# Patient Record
Sex: FEMALE | Race: WHITE | Employment: FULL TIME | URBAN - NONMETROPOLITAN AREA
[De-identification: names, ages, dates, MRNs, and addresses within clinical notes are randomized per-mention and may not be internally consistent; named-entity substitution may affect disease eponyms.]

---

## 2017-12-28 ENCOUNTER — OFFICE VISIT (OUTPATIENT)
Dept: PRIMARY CARE CLINIC | Age: 47
End: 2017-12-28
Payer: COMMERCIAL

## 2017-12-28 VITALS
HEART RATE: 85 BPM | BODY MASS INDEX: 26.43 KG/M2 | HEIGHT: 61 IN | WEIGHT: 140 LBS | RESPIRATION RATE: 24 BRPM | SYSTOLIC BLOOD PRESSURE: 113 MMHG | DIASTOLIC BLOOD PRESSURE: 75 MMHG | OXYGEN SATURATION: 95 % | TEMPERATURE: 97.4 F

## 2017-12-28 DIAGNOSIS — J02.9 SORE THROAT: ICD-10-CM

## 2017-12-28 DIAGNOSIS — J02.0 STREP PHARYNGITIS: Primary | ICD-10-CM

## 2017-12-28 LAB
INFLUENZA A ANTIBODY: NEGATIVE
INFLUENZA B ANTIBODY: NEGATIVE
S PYO AG THROAT QL: POSITIVE

## 2017-12-28 PROCEDURE — 87804 INFLUENZA ASSAY W/OPTIC: CPT | Performed by: NURSE PRACTITIONER

## 2017-12-28 PROCEDURE — G8427 DOCREV CUR MEDS BY ELIG CLIN: HCPCS | Performed by: NURSE PRACTITIONER

## 2017-12-28 PROCEDURE — 87880 STREP A ASSAY W/OPTIC: CPT | Performed by: NURSE PRACTITIONER

## 2017-12-28 PROCEDURE — G8419 CALC BMI OUT NRM PARAM NOF/U: HCPCS | Performed by: NURSE PRACTITIONER

## 2017-12-28 PROCEDURE — 99213 OFFICE O/P EST LOW 20 MIN: CPT | Performed by: NURSE PRACTITIONER

## 2017-12-28 PROCEDURE — G8484 FLU IMMUNIZE NO ADMIN: HCPCS | Performed by: NURSE PRACTITIONER

## 2017-12-28 PROCEDURE — 4004F PT TOBACCO SCREEN RCVD TLK: CPT | Performed by: NURSE PRACTITIONER

## 2017-12-28 RX ORDER — PENICILLIN V POTASSIUM 500 MG/1
500 TABLET ORAL 3 TIMES DAILY
Qty: 30 TABLET | Refills: 0 | Status: SHIPPED | OUTPATIENT
Start: 2017-12-28 | End: 2018-01-04

## 2017-12-28 ASSESSMENT — ENCOUNTER SYMPTOMS
WHEEZING: 0
COUGH: 0
SORE THROAT: 1

## 2017-12-28 NOTE — PROGRESS NOTES
Review of Systems   Constitutional: Positive for chills, fatigue and fever. HENT: Positive for sore throat. Negative for congestion, ear discharge and ear pain. Respiratory: Negative for cough and wheezing. Cardiovascular: Negative. Skin: Negative. Hematological: Negative. Objective:     Physical Exam   Constitutional: She appears well-developed and well-nourished. She is cooperative. Appearing to be of stated age with warm and dry skin, no apparent distress; well-appearing, well-hydrated, non-toxic, alert and oriented, in no apparent distress. HENT:   Head: Normocephalic and atraumatic. Right Ear: Tympanic membrane normal.   Left Ear: Tympanic membrane normal.   Nose: Nose normal.   Mouth/Throat: Uvula is midline and mucous membranes are normal. No trismus in the jaw. No dental abscesses. Posterior oropharyngeal erythema (mild) present. No oropharyngeal exudate or posterior oropharyngeal edema. Cardiovascular: Normal rate, regular rhythm and normal heart sounds. No murmur heard. Pulmonary/Chest: Effort normal and breath sounds normal.    Normal respiratory effort. Lungs clear to auscultation over anterior and posterior lung fields bilaterally. No rales, rhonchi or wheezing. No SOB or coughing noted during exam conversation. Lymphadenopathy:     Cervical adenopathy: shotty bilateraly anterior cervical chains. Nursing note and vitals reviewed. /75   Pulse 85   Temp 97.4 °F (36.3 °C) (Oral)   Resp 24   Ht 5' 1.2\" (1.554 m)   Wt 140 lb (63.5 kg)   SpO2 95%   BMI 26.28 kg/m²     Assessment:     1. Strep pharyngitis  penicillin v potassium (VEETID) 500 MG tablet   2. Sore throat  POCT rapid strep A    POCT Influenza A/B       Plan:   Palak Leon was seen today for other, fever and fatigue. Diagnoses and all orders for this visit:    Strep pharyngitis  -     penicillin v potassium (VEETID) 500 MG tablet;  Take 1 tablet by mouth 3 times daily for 10 days    Sore throat  -     POCT rapid strep A   Positive; results discussed with patient at time of exam.  -     POCT Influenza A/B   Negative; results discussed with patient time of exam.    Will recommend PCN VK and acetaminophen, ibuprofen with supportive care including increased rest and hydration. NP reviewed  administration, expected effects and possible side effects and have encouraged a probiotic of choice. Questions answered. They verbalized understanding and agreement. Patient instructions written and verbal given. Carlotta Elias and or guardian received counseling on medication adherence    Return for ED for worsening symptoms.     Orders Placed This Encounter   Medications    penicillin v potassium (VEETID) 500 MG tablet     Sig: Take 1 tablet by mouth 3 times daily for 10 days     Dispense:  30 tablet     Refill:  0        Electronically signed by Yvon Walter NP on 12/28/2017 at 7:02 PM

## 2017-12-28 NOTE — PATIENT INSTRUCTIONS
Patient Education        Strep Throat: Care Instructions  Your Care Instructions    Strep throat is a bacterial infection that causes sudden, severe sore throat and fever. Strep throat, which is caused by bacteria called streptococcus, is treated with antibiotics. Sometimes a strep test is necessary to tell if the sore throat is caused by strep bacteria. Treatment can help ease symptoms and may prevent future problems. Follow-up care is a key part of your treatment and safety. Be sure to make and go to all appointments, and call your doctor if you are having problems. It's also a good idea to know your test results and keep a list of the medicines you take. How can you care for yourself at home? · Take your antibiotics as directed. Do not stop taking them just because you feel better. You need to take the full course of antibiotics. · Strep throat can spread to others until 24 hours after you begin taking antibiotics. During this time, you should avoid contact with other people at work or home, especially infants and children. Do not sneeze or cough on others, and wash your hands often. Keep your drinking glass and eating utensils separate from those of others, and wash these items well in hot, soapy water. · Gargle with warm salt water at least once each hour to help reduce swelling and make your throat feel better. Use 1 teaspoon of salt mixed in 8 fluid ounces of warm water. · Take an over-the-counter pain medication, such as acetaminophen (Tylenol), ibuprofen (Advil, Motrin), or naproxen (Aleve). Read and follow all instructions on the label. · Try an over-the-counter anesthetic throat spray or throat lozenges, which may help relieve throat pain. · Drink plenty of fluids. Fluids may help soothe an irritated throat. Hot fluids, such as tea or soup, may help your throat feel better. · Eat soft solids and drink plenty of clear liquids.  Flavored ice pops, ice cream, scrambled eggs, sherbet, and gelatin dessert (such as Jell-O) may also soothe the throat. · Get lots of rest.  · Do not smoke, and avoid secondhand smoke. If you need help quitting, talk to your doctor about stop-smoking programs and medicines. These can increase your chances of quitting for good. · Use a vaporizer or humidifier to add moisture to the air in your bedroom. Follow the directions for cleaning the machine. When should you call for help? Call your doctor now or seek immediate medical care if:  ? · You have a new or higher fever. ? · You have a fever with a stiff neck or severe headache. ? · You have new or worse trouble swallowing. ? · Your sore throat gets much worse on one side. ? · Your pain becomes much worse on one side of your throat. ? Watch closely for changes in your health, and be sure to contact your doctor if:  ? · You are not getting better after 2 days (48 hours). ? · You do not get better as expected. Where can you learn more? Go to https://[employer]. org and sign in to your [employer] account. Enter K625 in the [facility] box to learn more about \"Strep Throat: Care Instructions. \"     If you do not have an account, please click on the \"Sign Up Now\" link. Current as of: May 12, 2017  Content Version: 11.4  © 2098-2029 [employer]. Care instructions adapted under license by [facility] ([facility]). If you have questions about a medical condition or this instruction, always ask your healthcare professional. [place] [address] any warranty or liability for your use of this information. Patient Education          penicillin V potassium (oral)  Pronunciation:  PEN i MAXIMILIAN in V corbin TAS ee um  Brand:  PC Pen VK  What is the most important information I should know about penicillin V potassium? You should not take this medication if you are allergic to penicillin.  Tell your doctor if you have ever had an allergic reaction to a cephalosporin antibiotic such as Ceftin, Cefzil, Omnicef, Keflex, and others. Before taking penicillin V potassium, tell your doctor if you are allergic to cephalosporins such as Ceftin, Cefzil, Keflex, Omnicef, and others, or if you have asthma, kidney disease, a bleeding or blood clotting disorder, a history of diarrhea caused by taking antibiotics, or a history of any type of allergy. Penicillin V potassium can make birth control pills less effective, which may result in pregnancy. Before taking penicillin V potassium, tell your doctor if you use birth control pills. Take this medication for the full prescribed length of time. Your symptoms may improve before the infection is completely cleared. Penicillin V potassium will not treat a viral infection such as the common cold or flu. Do not share this medication with another person, even if they have the same symptoms you have. Antibiotic medicines can cause diarrhea, which may be a sign of a new infection. If you have diarrhea that is watery or has blood in it, call your doctor. Do not use any medicine to stop the diarrhea unless your doctor has told you to. What is penicillin V potassium? Penicillin V potassium is a slow-onset antibiotic that fights bacteria in your body. Penicillin V potassium is used to treat many different types of infections including strep and staph infections, pneumonia, rheumatic fever, and infections affecting the mouth or throat. Penicillin V potassium is also used to prevent infections of the heart valves in people with certain heart conditions who need to have dental work or surgery. Penicillin V potassium may also be used for purposes not listed in this medication guide. What should I discuss with my healthcare provider before taking penicillin V potassium? You should not take this medication if you are allergic to penicillin.  Tell your doctor if you have ever had an allergic reaction to a cephalosporin antibiotic such as Ceftin, Cefzil, Omnicef, Keflex, extra medicine to make up the missed dose. What happens if I overdose? Seek emergency medical attention if you think you have used too much of this medicine. Overdose symptoms may include some of the serious side effects listed in this medication guide. What should I avoid while taking penicillin V potassium? Antibiotic medicines can cause diarrhea, which may be a sign of a new infection. If you have diarrhea that is watery or has blood in it, call your doctor. Do not use any medicine to stop the diarrhea unless your doctor has told you to. What are the possible side effects of penicillin V potassium? Get emergency medical help if you have any of these signs of an allergic reaction: hives; difficulty breathing; swelling of your face, lips, tongue, or throat. Call your doctor at once if you have any of these serious side effects:  · diarrhea that is watery or bloody;  · urinating less than usual or not at all;  · fever, swollen glands, sore throat, rash or itching, joint pain, general ill feeling;  · skin rash, bruising, severe tingling, numbness, pain, muscle weakness;  · pale or yellowed skin, dark colored urine, fever, confusion or weakness; or  · easy bruising, unusual bleeding (nose, mouth, vagina, or rectum), purple or red pinpoint spots under your skin. Less serious side effects are more likely to occur, such as:  · nausea, vomiting, stomach pain;  · swollen, black, or \"hairy\" tongue; or  · vaginal itching or discharge. This is not a complete list of side effects and others may occur. Call your doctor for medical advice about side effects. You may report side effects to FDA at 7-453-FDA-4118. What other drugs will affect penicillin V potassium?   Tell your doctor about all other medicines you use, especially:  · birth control pills;  · methotrexate (Rheumatrex, Trexall);  · probenecid (Benemid); or  · a tetracycline antibiotic, such as doxycycline (Doryx, Oracea, Periostat, Vibramycin), minocycline (Dynacin, Minocin, Solodyn, [place]), or tetracycline (Brodspec, Panmycin, [place], [place]). This list is not complete and other drugs may interact with penicillin V potassium. Tell your doctor about all medications you use. This includes prescription, over-the-counter, vitamin, and herbal products. Do not start a new medication without telling your doctor. Where can I get more information? Your pharmacist can provide more information about penicillin V potassium. Remember, keep this and all other medicines out of the reach of children, never share your medicines with others, and use this medication only for the indication prescribed. Every effort has been made to ensure that the information provided by [address] is accurate, up-to-date, and complete, but no guarantee is made to that effect. Drug information contained herein may be time sensitive. [facility] information has been compiled for use by healthcare practitioners and consumers in the [place] and therefore [facility] does not warrant that uses outside of the [place] are appropriate, unless specifically indicated otherwise. [facility]'s drug information does not endorse drugs, diagnose patients or recommend therapy. [facility] drug information is an informational resource designed to assist licensed healthcare practitioners in caring for their patients and/or to serve consumers viewing this service as a supplement to, and not a substitute for, the expertise, skill, knowledge and judgment of healthcare practitioners. The absence of a warning for a given drug or drug combination in no way should be construed to indicate that the drug or drug combination is safe, effective or appropriate for any given patient. [facility] does not assume any responsibility for any aspect of healthcare administered with the aid of information [facility] provides.  The information contained herein is not intended to cover all possible uses, directions, precautions, warnings, drug interactions, allergic reactions, or adverse effects. If you have questions about the drugs you are taking, check with your doctor, nurse or pharmacist.  Copyright 7884-8675 [address]. Version: 1.01. Revision date: 2/7/2011. Care instructions adapted under license by [facility] ([facility]). If you have questions about a medical condition or this instruction, always ask your healthcare professional. [place] [address] any warranty or liability for your use of this information.

## 2018-01-02 ENCOUNTER — TELEPHONE (OUTPATIENT)
Dept: PRIMARY CARE CLINIC | Age: 48
End: 2018-01-02

## 2018-01-02 NOTE — TELEPHONE ENCOUNTER
Advised pt if Tylenol is not helping her headache , provider advised for her to go to the ER. Pt voiced understanding. Health Maintenance   Topic Date Due    HIV screen  06/04/1985    DTaP/Tdap/Td vaccine (1 - Tdap) 06/04/1989    Pneumococcal med risk (1 of 1 - PPSV23) 06/04/1989    Cervical cancer screen  06/04/1991    Lipid screen  06/04/2010    Flu vaccine (1) 09/01/2017             (applicable per patient's age: Cancer Screenings, Depression Screening, Fall Risk Screening, Immunizations)    AST (U/L)   Date Value   04/29/2013 59 (H)     ALT (U/L)   Date Value   04/29/2013 118 (H)     BUN (mg/dL)   Date Value   01/25/2016 26 (H)      (goal A1C is < 7)   (goal LDL is <100) need 30-50% reduction from baseline     BP Readings from Last 3 Encounters:   12/28/17 113/75   08/04/16 108/62   01/25/16 119/72    (goal /80)      All Future Testing planned in [facility]:      Next Visit Date:  No future appointments.          Patient Active Problem List:     Smoker     Gastroesophageal reflux disease without esophagitis     Bronchitis

## 2018-01-04 ENCOUNTER — APPOINTMENT (OUTPATIENT)
Dept: CT IMAGING | Age: 48
End: 2018-01-04
Payer: COMMERCIAL

## 2018-01-04 ENCOUNTER — HOSPITAL ENCOUNTER (EMERGENCY)
Age: 48
Discharge: HOME | End: 2018-01-04
Attending: FAMILY MEDICINE
Payer: COMMERCIAL

## 2018-01-04 VITALS
OXYGEN SATURATION: 98 % | DIASTOLIC BLOOD PRESSURE: 71 MMHG | SYSTOLIC BLOOD PRESSURE: 122 MMHG | TEMPERATURE: 98.3 F | RESPIRATION RATE: 16 BRPM | HEART RATE: 77 BPM

## 2018-01-04 DIAGNOSIS — R59.0 ANTERIOR CERVICAL ADENOPATHY: ICD-10-CM

## 2018-01-04 DIAGNOSIS — J03.90 ACUTE TONSILLITIS, UNSPECIFIED ETIOLOGY: Primary | ICD-10-CM

## 2018-01-04 LAB
ABSOLUTE EOS #: 0.2 K/UL (ref 0–0.4)
ABSOLUTE IMMATURE GRANULOCYTE: NORMAL K/UL (ref 0–0.3)
ABSOLUTE LYMPH #: 2.6 K/UL (ref 1–4.8)
ABSOLUTE MONO #: 0.7 K/UL (ref 0–1)
ANION GAP SERPL CALCULATED.3IONS-SCNC: 16 MMOL/L (ref 9–17)
BASOPHILS # BLD: 0 % (ref 0–2)
BASOPHILS ABSOLUTE: 0 K/UL (ref 0–0.2)
BUN BLDV-MCNC: 16 MG/DL (ref 6–20)
BUN/CREAT BLD: 31 (ref 9–20)
CALCIUM SERPL-MCNC: 9.6 MG/DL (ref 8.6–10.4)
CHLORIDE BLD-SCNC: 101 MMOL/L (ref 98–107)
CO2: 21 MMOL/L (ref 20–31)
CREAT SERPL-MCNC: 0.52 MG/DL (ref 0.5–0.9)
DIFFERENTIAL TYPE: YES
EOSINOPHILS RELATIVE PERCENT: 2 % (ref 0–5)
GFR AFRICAN AMERICAN: >60 ML/MIN
GFR NON-AFRICAN AMERICAN: >60 ML/MIN
GFR SERPL CREATININE-BSD FRML MDRD: ABNORMAL ML/MIN/{1.73_M2}
GFR SERPL CREATININE-BSD FRML MDRD: ABNORMAL ML/MIN/{1.73_M2}
GLUCOSE BLD-MCNC: 100 MG/DL (ref 70–99)
HCT VFR BLD CALC: 41.5 % (ref 36–46)
HEMOGLOBIN: 14 G/DL (ref 12–16)
IMMATURE GRANULOCYTES: NORMAL %
LYMPHOCYTES # BLD: 27 % (ref 15–40)
MCH RBC QN AUTO: 30 PG (ref 26–34)
MCHC RBC AUTO-ENTMCNC: 33.7 G/DL (ref 31–37)
MCV RBC AUTO: 89 FL (ref 80–100)
MONOCYTES # BLD: 7 % (ref 4–8)
MONONUCLEOSIS SCREEN: NEGATIVE
PDW BLD-RTO: 14.5 % (ref 12.1–15.2)
PLATELET # BLD: 375 K/UL (ref 140–450)
PLATELET ESTIMATE: NORMAL
PMV BLD AUTO: NORMAL FL (ref 6–12)
POTASSIUM SERPL-SCNC: 4.1 MMOL/L (ref 3.7–5.3)
RBC # BLD: 4.66 M/UL (ref 4–5.2)
RBC # BLD: NORMAL 10*6/UL
SEG NEUTROPHILS: 64 % (ref 47–75)
SEGMENTED NEUTROPHILS ABSOLUTE COUNT: 6.1 K/UL (ref 2.5–7)
SODIUM BLD-SCNC: 138 MMOL/L (ref 135–144)
WBC # BLD: 9.7 K/UL (ref 3.5–11)
WBC # BLD: NORMAL 10*3/UL

## 2018-01-04 PROCEDURE — 99284 EMERGENCY DEPT VISIT MOD MDM: CPT

## 2018-01-04 PROCEDURE — 6360000002 HC RX W HCPCS: Performed by: EMERGENCY MEDICINE

## 2018-01-04 PROCEDURE — 2580000003 HC RX 258: Performed by: EMERGENCY MEDICINE

## 2018-01-04 PROCEDURE — 80048 BASIC METABOLIC PNL TOTAL CA: CPT

## 2018-01-04 PROCEDURE — 6360000004 HC RX CONTRAST MEDICATION: Performed by: FAMILY MEDICINE

## 2018-01-04 PROCEDURE — 85025 COMPLETE CBC W/AUTO DIFF WBC: CPT

## 2018-01-04 PROCEDURE — 36415 COLL VENOUS BLD VENIPUNCTURE: CPT

## 2018-01-04 PROCEDURE — 70491 CT SOFT TISSUE NECK W/DYE: CPT

## 2018-01-04 PROCEDURE — 86308 HETEROPHILE ANTIBODY SCREEN: CPT

## 2018-01-04 RX ORDER — CEFDINIR 300 MG/1
300 CAPSULE ORAL 2 TIMES DAILY
Qty: 14 CAPSULE | Refills: 0 | Status: SHIPPED | OUTPATIENT
Start: 2018-01-04 | End: 2018-01-11

## 2018-01-04 RX ADMIN — IOVERSOL 75 ML: 741 INJECTION INTRA-ARTERIAL; INTRAVENOUS at 08:05

## 2018-01-04 ASSESSMENT — PAIN SCALES - GENERAL
PAINLEVEL_OUTOF10: 8
PAINLEVEL_OUTOF10: 8

## 2018-01-04 NOTE — ED PROVIDER NOTES
RADIOLOGY: non-plain film images(s) such as CT, Ultrasound and MRI are read by the radiologist.  CT Soft Tissue Neck W Contrast   Final Result      Symmetric prominence of the adenoid and palatine tonsils which appear    hyperenhancing/hyperemic. No evident mass, fluid collection or abscess. Findings may be consistent with tonsillitis/pharyngitis. LABS:   Labs Reviewed   BASIC METABOLIC PANEL - Abnormal; Notable for the following:        Result Value    Glucose 100 (*)     Bun/Cre Ratio 31 (*)     All other components within normal limits   CBC WITH AUTO DIFFERENTIAL   MONONUCLEOSIS SCREEN       EMERGENCY DEPARTMENT COURSE:   Vitals:    Vitals:    01/04/18 0636 01/04/18 0815   BP: 128/71 122/71   Pulse: 88 77   Resp: 20 16   Temp: 97.6 °F (36.4 °C) 98.3 °F (36.8 °C)   TempSrc: Oral    SpO2: 97% 98%     Patient history and physical exam taken at bedside, discussed patient's symptoms and exam findings as well as plan workup to include blood work, IV access, CT soft tissue neck. Patient sitting upright in bed position of comfort, acknowledges workup. Labs reviewed    Patient care transferred to Dr. Kayode Paris, ED physician, at 0800 hours        FINAL IMPRESSION      1. Acute tonsillitis, unspecified etiology    2.  Anterior cervical adenopathy          DISPOSITION/PLAN       PATIENT REFERRED TO:  Sheryl Sotelo NP  [address]  [phone]    In 2 days  As needed, If symptoms worsen      DISCHARGE MEDICATIONS:  Discharge Medication List as of 1/4/2018  9:16 AM      START taking these medications    Details   cefdinir (OMNICEF) 300 MG capsule Take 1 capsule by mouth 2 times daily for 7 days, Disp-14 capsule, R-0Print                 Summation      Patient Course:     ED Medications administered this visit:    Medications   ioversol (OPTIRAY) 74 % injection 75 mL (75 mLs Intravenous Given 1/4/18 0805)       New Prescriptions from this visit:    Discharge Medication List as of 1/4/2018  9:16 AM      START taking these medications    Details   cefdinir (OMNICEF) 300 MG capsule Take 1 capsule by mouth 2 times daily for 7 days, Disp-14 capsule, R-0Print             Follow-up:  Sy Yusuf NP  [address] [facility] [place] [address]  [phone]    In 2 days  As needed, If symptoms worsen        Final Impression:   1. Acute tonsillitis, unspecified etiology    2.  Anterior cervical adenopathy               (Please note that portions of this note were completed with a voice recognition program.  Efforts were made to edit the dictations but occasionally words are mis-transcribed.)    MD Georgette Hagen MD  01/05/18 4590

## 2018-01-24 ENCOUNTER — HOSPITAL ENCOUNTER (EMERGENCY)
Age: 48
Discharge: HOME | End: 2018-01-24
Attending: FAMILY MEDICINE
Payer: COMMERCIAL

## 2018-01-24 VITALS
BODY MASS INDEX: 25.76 KG/M2 | SYSTOLIC BLOOD PRESSURE: 117 MMHG | WEIGHT: 140 LBS | HEART RATE: 79 BPM | TEMPERATURE: 98.2 F | RESPIRATION RATE: 16 BRPM | OXYGEN SATURATION: 98 % | HEIGHT: 62 IN | DIASTOLIC BLOOD PRESSURE: 77 MMHG

## 2018-01-24 DIAGNOSIS — K21.9 GASTROESOPHAGEAL REFLUX DISEASE, ESOPHAGITIS PRESENCE NOT SPECIFIED: Primary | ICD-10-CM

## 2018-01-24 LAB
ABSOLUTE EOS #: 0.2 K/UL (ref 0–0.4)
ABSOLUTE IMMATURE GRANULOCYTE: NORMAL K/UL (ref 0–0.3)
ABSOLUTE LYMPH #: 2.8 K/UL (ref 1–4.8)
ABSOLUTE MONO #: 0.4 K/UL (ref 0–1)
ANION GAP SERPL CALCULATED.3IONS-SCNC: 12 MMOL/L (ref 9–17)
BASOPHILS # BLD: 1 % (ref 0–2)
BASOPHILS ABSOLUTE: 0 K/UL (ref 0–0.2)
BUN BLDV-MCNC: 11 MG/DL (ref 6–20)
BUN/CREAT BLD: 17 (ref 9–20)
CALCIUM SERPL-MCNC: 9.4 MG/DL (ref 8.6–10.4)
CHLORIDE BLD-SCNC: 102 MMOL/L (ref 98–107)
CO2: 25 MMOL/L (ref 20–31)
CREAT SERPL-MCNC: 0.66 MG/DL (ref 0.5–0.9)
DIFFERENTIAL TYPE: YES
DIRECT EXAM: NORMAL
EOSINOPHILS RELATIVE PERCENT: 2 % (ref 0–5)
GFR AFRICAN AMERICAN: >60 ML/MIN
GFR NON-AFRICAN AMERICAN: >60 ML/MIN
GFR SERPL CREATININE-BSD FRML MDRD: NORMAL ML/MIN/{1.73_M2}
GFR SERPL CREATININE-BSD FRML MDRD: NORMAL ML/MIN/{1.73_M2}
GLUCOSE BLD-MCNC: 96 MG/DL (ref 70–99)
HCT VFR BLD CALC: 39.9 % (ref 36–46)
HEMOGLOBIN: 13.3 G/DL (ref 12–16)
IMMATURE GRANULOCYTES: NORMAL %
LYMPHOCYTES # BLD: 36 % (ref 15–40)
Lab: NORMAL
Lab: NORMAL
MCH RBC QN AUTO: 30 PG (ref 26–34)
MCHC RBC AUTO-ENTMCNC: 33.4 G/DL (ref 31–37)
MCV RBC AUTO: 89.6 FL (ref 80–100)
MONOCYTES # BLD: 5 % (ref 4–8)
MONONUCLEOSIS SCREEN: NEGATIVE
NRBC AUTOMATED: NORMAL PER 100 WBC
PDW BLD-RTO: 14.5 % (ref 12.1–15.2)
PLATELET # BLD: 300 K/UL (ref 140–450)
PLATELET ESTIMATE: NORMAL
PMV BLD AUTO: NORMAL FL (ref 6–12)
POTASSIUM SERPL-SCNC: 3.8 MMOL/L (ref 3.7–5.3)
RBC # BLD: 4.45 M/UL (ref 4–5.2)
RBC # BLD: NORMAL 10*6/UL
SEG NEUTROPHILS: 56 % (ref 47–75)
SEGMENTED NEUTROPHILS ABSOLUTE COUNT: 4.4 K/UL (ref 2.5–7)
SODIUM BLD-SCNC: 139 MMOL/L (ref 135–144)
SPECIMEN DESCRIPTION: NORMAL
SPECIMEN DESCRIPTION: NORMAL
STATUS: NORMAL
STATUS: NORMAL
WBC # BLD: 7.8 K/UL (ref 3.5–11)
WBC # BLD: NORMAL 10*3/UL

## 2018-01-24 PROCEDURE — 87651 STREP A DNA AMP PROBE: CPT

## 2018-01-24 PROCEDURE — 99283 EMERGENCY DEPT VISIT LOW MDM: CPT

## 2018-01-24 PROCEDURE — 80048 BASIC METABOLIC PNL TOTAL CA: CPT

## 2018-01-24 PROCEDURE — 86308 HETEROPHILE ANTIBODY SCREEN: CPT

## 2018-01-24 PROCEDURE — 85025 COMPLETE CBC W/AUTO DIFF WBC: CPT

## 2018-01-24 PROCEDURE — 36415 COLL VENOUS BLD VENIPUNCTURE: CPT

## 2018-01-24 PROCEDURE — 87804 INFLUENZA ASSAY W/OPTIC: CPT

## 2018-01-24 RX ORDER — PANTOPRAZOLE SODIUM 40 MG/1
40 TABLET, DELAYED RELEASE ORAL DAILY
Qty: 30 TABLET | Refills: 0 | Status: SHIPPED | OUTPATIENT
Start: 2018-01-24 | End: 2022-01-05

## 2018-01-24 RX ORDER — ALUMINA, MAGNESIA, AND SIMETHICONE 2400; 2400; 240 MG/30ML; MG/30ML; MG/30ML
30 SUSPENSION ORAL EVERY 6 HOURS PRN
Qty: 1 BOTTLE | Refills: 0 | Status: SHIPPED | OUTPATIENT
Start: 2018-01-24 | End: 2019-03-19 | Stop reason: ALTCHOICE

## 2018-01-24 ASSESSMENT — PAIN DESCRIPTION - LOCATION: LOCATION: FOOT;THROAT

## 2018-01-24 ASSESSMENT — PAIN DESCRIPTION - ORIENTATION: ORIENTATION: RIGHT

## 2018-01-24 ASSESSMENT — PAIN SCALES - GENERAL: PAINLEVEL_OUTOF10: 3

## 2018-01-25 LAB
DIRECT EXAM: NORMAL
DIRECT EXAM: NORMAL
Lab: NORMAL
SPECIMEN DESCRIPTION: NORMAL
SPECIMEN DESCRIPTION: NORMAL
STATUS: NORMAL

## 2019-03-19 ENCOUNTER — HOSPITAL ENCOUNTER (EMERGENCY)
Age: 49
Discharge: HOME | End: 2019-03-19
Attending: FAMILY MEDICINE
Payer: COMMERCIAL

## 2019-03-19 VITALS
DIASTOLIC BLOOD PRESSURE: 74 MMHG | HEART RATE: 82 BPM | WEIGHT: 148 LBS | BODY MASS INDEX: 27.07 KG/M2 | RESPIRATION RATE: 20 BRPM | OXYGEN SATURATION: 99 % | SYSTOLIC BLOOD PRESSURE: 135 MMHG | TEMPERATURE: 97.9 F

## 2019-03-19 DIAGNOSIS — J01.90 SUBACUTE SINUSITIS, UNSPECIFIED LOCATION: Primary | ICD-10-CM

## 2019-03-19 PROCEDURE — 99283 EMERGENCY DEPT VISIT LOW MDM: CPT

## 2019-03-19 PROCEDURE — 6370000000 HC RX 637 (ALT 250 FOR IP): Performed by: FAMILY MEDICINE

## 2019-03-19 RX ORDER — CHOLESTYRAMINE LIGHT 4 G/5.7G
4 POWDER, FOR SUSPENSION ORAL DAILY
COMMUNITY
Start: 2018-10-05 | End: 2022-01-05

## 2019-03-19 RX ORDER — LATANOPROST 50 UG/ML
1 SOLUTION/ DROPS OPHTHALMIC NIGHTLY
COMMUNITY
Start: 2018-08-30 | End: 2019-06-23 | Stop reason: ALTCHOICE

## 2019-03-19 RX ORDER — TETRACAINE HYDROCHLORIDE 5 MG/ML
1 SOLUTION OPHTHALMIC ONCE
Status: COMPLETED | OUTPATIENT
Start: 2019-03-19 | End: 2019-03-19

## 2019-03-19 RX ORDER — IBUPROFEN 600 MG/1
600 TABLET ORAL EVERY 6 HOURS PRN
COMMUNITY

## 2019-03-19 RX ORDER — AMOXICILLIN AND CLAVULANATE POTASSIUM 875; 125 MG/1; MG/1
1 TABLET, FILM COATED ORAL 2 TIMES DAILY
Qty: 20 TABLET | Refills: 0 | Status: SHIPPED | OUTPATIENT
Start: 2019-03-19 | End: 2019-03-29

## 2019-03-19 RX ORDER — LORATADINE 10 MG/1
10 TABLET ORAL DAILY
COMMUNITY
Start: 2018-11-19 | End: 2019-05-22 | Stop reason: ALTCHOICE

## 2019-03-19 RX ADMIN — TETRACAINE HYDROCHLORIDE 1 DROP: 5 SOLUTION OPHTHALMIC at 08:45

## 2019-05-22 ENCOUNTER — HOSPITAL ENCOUNTER (EMERGENCY)
Age: 49
Discharge: HOME | End: 2019-05-22
Attending: FAMILY MEDICINE
Payer: COMMERCIAL

## 2019-05-22 VITALS
HEART RATE: 85 BPM | SYSTOLIC BLOOD PRESSURE: 128 MMHG | RESPIRATION RATE: 20 BRPM | TEMPERATURE: 98 F | WEIGHT: 145 LBS | DIASTOLIC BLOOD PRESSURE: 78 MMHG | BODY MASS INDEX: 26.52 KG/M2 | OXYGEN SATURATION: 100 %

## 2019-05-22 DIAGNOSIS — J01.11 ACUTE RECURRENT FRONTAL SINUSITIS: Primary | ICD-10-CM

## 2019-05-22 DIAGNOSIS — Z87.09 HISTORY OF CHRONIC SINUSITIS: ICD-10-CM

## 2019-05-22 PROCEDURE — 99282 EMERGENCY DEPT VISIT SF MDM: CPT

## 2019-05-22 RX ORDER — AMOXICILLIN AND CLAVULANATE POTASSIUM 875; 125 MG/1; MG/1
1 TABLET, FILM COATED ORAL 2 TIMES DAILY
Qty: 20 TABLET | Refills: 0 | Status: SHIPPED | OUTPATIENT
Start: 2019-05-22 | End: 2019-06-01

## 2019-05-22 RX ORDER — ASPIRIN/CALCIUM/MAG/ALUMINUM 325 MG
1 TABLET ORAL DAILY
COMMUNITY
End: 2022-02-10 | Stop reason: ALTCHOICE

## 2019-05-22 RX ORDER — FLUCONAZOLE 100 MG/1
100 TABLET ORAL DAILY
Qty: 2 TABLET | Refills: 0 | Status: SHIPPED | OUTPATIENT
Start: 2019-05-22 | End: 2019-06-23 | Stop reason: ALTCHOICE

## 2019-05-22 RX ORDER — FLUTICASONE PROPIONATE 50 MCG
2 SPRAY, SUSPENSION (ML) NASAL DAILY
COMMUNITY
Start: 2019-04-01 | End: 2022-01-05

## 2019-05-22 NOTE — ED PROVIDER NOTES
[address] [facility]  eMERGENCY dEPARTMENT eNCOUnter          [address] [facility]       Chief Complaint   Patient presents with    Sinusitis     running nose, congestion, headache and sinus pressure for past 2 weeks recently finished rx of pcn        Nurses Notes reviewed and I agree except as noted in the HPI. HISTORY OF PRESENT ILLNESS    Bird Ruelas is a 50 y.o. female who presents to emergency room via private vehicle, complaining of sinus pain and pressure greatest in the right frontal sinus, as well as some discomfort along her top teeth and gums. Patient has had significant nasal drainage, denies any fever chills, does have occasional cough. Patient has seen otolaryngology regarding her chronic sinusitis, states was advised to get CT of sinuses for further evaluation but has been unable to do so due to work limitations until June. Patient states she was told by ENT to stop using Flonase after 2 weeks, patient states she complied although did give her some relief. Patient states she stopped taking Claritin as she no longer felt it was helping. Patient does continue to smoke a 1/2 pack per day    REVIEW OF SYSTEMS     Review of Systems   All other systems reviewed and are negative. PAST MEDICAL HISTORY    has a past medical history of Asthma. SURGICAL HISTORY      has a past surgical history that includes Hysterectomy; Tubal ligation; Cholecystectomy; and shoulder surgery.     CURRENT MEDICATIONS       Discharge Medication List as of 5/22/2019  8:48 AM      CONTINUE these medications which have NOT CHANGED    Details   fluticasone (FLONASE) 50 MCG/ACT nasal spray 2 sprays by Nasal route dailyHistorical Med      Aspirin Buf,DpXde-CaJta-IrGkp, (BUFFERED ASPIRIN) 325 MG TABS Take 1 tablet by mouth dailyHistorical Med      latanoprost (XALATAN) 0.005 % ophthalmic solution Apply 1 drop to eye nightlyHistorical Med      pantoprazole (PROTONIX) 40 MG tablet Take 1 tablet by mouth daily At bedtime, Disp-30 tablet, R-0Print      ibuprofen (IBU) 600 MG tablet Take 600 mg by mouth every 6 hours as neededHistorical Med      cholestyramine light 4 g packet Take 4 g by mouth dailyHistorical Med             ALLERGIES     is allergic to adhesive tape; codeine; demerol hcl [meperidine]; dexamethasone; hydrocodone-acetaminophen; oxycodone-acetaminophen; and prednisone. FAMILY HISTORY     has no family status information on file. family history is not on file. SOCIAL HISTORY      reports that she has been smoking cigarettes. She has been smoking about 0.50 packs per day. She has never used smokeless tobacco. She reports that she does not drink alcohol or use drugs. PHYSICAL EXAM     INITIAL VITALS:  weight is 145 lb (65.8 kg). Her oral temperature is 98 °F (36.7 °C). Her blood pressure is 128/78 and her pulse is 85. Her respiration is 20 and oxygen saturation is 100%. Physical Exam   Constitutional: Patient is oriented to person, place, and time. Patient appears well-developed and well-nourished. Patient is active and cooperative. HENT:   Head: Normocephalic and atraumatic. Head is without contusion. Noted pain over the right frontal sinus, less so appreciated on left frontal and bilateral maxillary sinuses. Right Ear: Hearing and external ear normal. No drainage. Mild effusion   Left Ear: Hearing and external ear normal. No drainage. Mild effusion  Nose: Nose normal. No nasal deformity. No epistaxis. Mouth/Throat: Mucous membranes are not dry. No oral lesions or exudate, noted mild cobblestoning posterior pharynx  Eyes: EOMI. Conjunctivae, sclera, and lids are normal. Right eye exhibits no discharge. Left eye exhibits no discharge. Neck: Full passive range of motion without pain and phonation normal.   Cardiovascular:  Normal rate, regular rhythm and intact distal pulses.      Pulses: Right radial pulse  2+   Pulmonary/Chest: Effort normal. No tachypnea and no bradypnea. No wheezes, rhonchi, or rales. Abdominal: Soft. Patient without distension   Musculoskeletal:   Negative acute trauma or deformity,  apparent full range of motion and normal strength all extremities appropriate to age. Neurological: Patient is alert and oriented to person, place, and time. patient displays no tremor. Patient displays no seizure activity. .  Lymphatic:  No cervical lymphadenopathy  Skin: Skin is warm and dry. Patient is not diaphoretic. Psychiatric: Patient has a normal mood and affect. Patient speech is normal and behavior is normal. Cognition and memory are normal.      DIFFERENTIAL DIAGNOSIS:   Acute on chronic sinusitis    DIAGNOSTIC RESULTS           RADIOLOGY: non-plain film images(s) such as CT, Ultrasound and MRI are read by the radiologist.  No orders to display       LABS:   Labs Reviewed - No data to display    EMERGENCY DEPARTMENT COURSE:   Vitals:    Vitals:    05/22/19 0827   BP: 128/78   Pulse: 85   Resp: 20   Temp: 98 °F (36.7 °C)   TempSrc: Oral   SpO2: 100%   Weight: 145 lb (65.8 kg)     Patient history and physical exam taken at bedside, discussed patient's symptoms and exam findings, discussed patient's chronic sinusitis, although patient is having increased pain in the right frontal will treat as acute on chronic at this time, confirmed allergies were initially patient on Augmentin. Patient is known to get yeast infections with antibiotics will give Diflucan as well. Discussed the patient possibly restarting her Flonase for a short amount of time, discussed possibly restarting her Claritin, discussed nasal saline, nasal rinses the patient does indicate she could not tolerate these due to discomfort. Patient is advised to quit smoking. Patient advised to get her scan of her sinuses when available, and follow-up with ENT as needed, acknowledges    FINAL IMPRESSION      1. Acute recurrent frontal sinusitis    2.  History of chronic sinusitis DISPOSITION/PLAN   Discharge    PATIENT REFERRED TO:  EDER Love CNP  [address] [place] [address]  [phone]    Call         DISCHARGE MEDICATIONS:  Discharge Medication List as of 5/22/2019  8:48 AM      START taking these medications    Details   amoxicillin-clavulanate (AUGMENTIN) 875-125 MG per tablet Take 1 tablet by mouth 2 times daily for 10 days, Disp-20 tablet, R-0Print      fluconazole (DIFLUCAN) 100 MG tablet Take 1 tablet by mouth daily Take one tab PO x1 now, and one tab PO x1 at end of antibiotic use (10days), Disp-2 tablet, R-0Print                 Summation      Patient Course:  Discharge    ED Medications administered this visit:  Medications - No data to display    New Prescriptions from this visit:    Discharge Medication List as of 5/22/2019  8:48 AM      START taking these medications    Details   amoxicillin-clavulanate (AUGMENTIN) 875-125 MG per tablet Take 1 tablet by mouth 2 times daily for 10 days, Disp-20 tablet, R-0Print      fluconazole (DIFLUCAN) 100 MG tablet Take 1 tablet by mouth daily Take one tab PO x1 now, and one tab PO x1 at end of antibiotic use (10days), Disp-2 tablet, R-0Print             Follow-up:  EDER Love CNP  [address] [address]  [phone]    Call           Final Impression:   1. Acute recurrent frontal sinusitis    2.  History of chronic sinusitis               (Please note that portions of this note were completed with a voice recognition program.  Efforts were made to edit the dictations but occasionally words are mis-transcribed.)    MD Vincent Bernal MD  05/22/19 9616

## 2019-06-23 ENCOUNTER — HOSPITAL ENCOUNTER (EMERGENCY)
Age: 49
Discharge: HOME | End: 2019-06-23
Attending: EMERGENCY MEDICINE
Payer: COMMERCIAL

## 2019-06-23 VITALS
WEIGHT: 142.5 LBS | TEMPERATURE: 98 F | HEART RATE: 79 BPM | BODY MASS INDEX: 26.06 KG/M2 | DIASTOLIC BLOOD PRESSURE: 81 MMHG | RESPIRATION RATE: 20 BRPM | SYSTOLIC BLOOD PRESSURE: 127 MMHG | OXYGEN SATURATION: 97 %

## 2019-06-23 DIAGNOSIS — J02.9 ACUTE PHARYNGITIS, UNSPECIFIED ETIOLOGY: ICD-10-CM

## 2019-06-23 DIAGNOSIS — R59.0 CERVICAL LYMPHADENOPATHY: Primary | ICD-10-CM

## 2019-06-23 LAB
DIRECT EXAM: NORMAL
Lab: NORMAL
SPECIMEN DESCRIPTION: NORMAL

## 2019-06-23 PROCEDURE — 99283 EMERGENCY DEPT VISIT LOW MDM: CPT

## 2019-06-23 PROCEDURE — 87880 STREP A ASSAY W/OPTIC: CPT

## 2019-06-23 RX ORDER — AMOXICILLIN AND CLAVULANATE POTASSIUM 500; 125 MG/1; MG/1
1 TABLET, FILM COATED ORAL 3 TIMES DAILY
Qty: 30 TABLET | Refills: 0 | Status: SHIPPED | OUTPATIENT
Start: 2019-06-23 | End: 2019-07-03

## 2019-06-23 RX ORDER — NAPROXEN 375 MG/1
375 TABLET ORAL 2 TIMES DAILY WITH MEALS
Qty: 20 TABLET | Refills: 0 | Status: SHIPPED | OUTPATIENT
Start: 2019-06-23 | End: 2022-01-05

## 2019-06-23 RX ORDER — FLUCONAZOLE 100 MG/1
100 TABLET ORAL DAILY
Qty: 5 TABLET | Refills: 0 | Status: SHIPPED | OUTPATIENT
Start: 2019-06-23 | End: 2019-06-28

## 2019-06-23 ASSESSMENT — PAIN DESCRIPTION - ORIENTATION: ORIENTATION: LEFT

## 2019-06-23 ASSESSMENT — PAIN DESCRIPTION - LOCATION: LOCATION: NECK

## 2019-06-23 ASSESSMENT — PAIN DESCRIPTION - PAIN TYPE: TYPE: ACUTE PAIN

## 2019-06-23 ASSESSMENT — PAIN SCALES - GENERAL: PAINLEVEL_OUTOF10: 8

## 2019-06-23 NOTE — ED PROVIDER NOTES
Prednisone Swelling       FAMILY HISTORY    History reviewed. No pertinent family history. SOCIAL HISTORY    Social History     Socioeconomic History    Marital status:      Spouse name: None    Number of children: None    Years of education: None    Highest education level: None   Occupational History    None   Social Needs    Financial resource strain: None    Food insecurity:     Worry: None     Inability: None    Transportation needs:     Medical: None     Non-medical: None   Tobacco Use    Smoking status: Current Every Day Smoker     Packs/day: 0.50     Types: Cigarettes     Last attempt to quit: 3/29/2013     Years since quittin.2    Smokeless tobacco: Never Used   Substance and Sexual Activity    Alcohol use: No    Drug use: No    Sexual activity: None   Lifestyle    Physical activity:     Days per week: None     Minutes per session: None    Stress: None   Relationships    Social connections:     Talks on phone: None     Gets together: None     Attends Mormonism service: None     Active member of club or organization: None     Attends meetings of clubs or organizations: None     Relationship status: None    Intimate partner violence:     Fear of current or ex partner: None     Emotionally abused: None     Physically abused: None     Forced sexual activity: None   Other Topics Concern    None   Social History Narrative    None       PHYSICAL EXAM    VITAL SIGNS: /81   Pulse 79   Temp 98 °F (36.7 °C)   Resp 20   Wt 142 lb 8 oz (64.6 kg)   SpO2 97%   BMI 26.06 kg/m²   Constitutional:  Well developed, well nourished, no acute distress, non-toxic appearance   Eyes: PERRL, conjunctiva normal   HENT: Pharyngeal erythema without exudate.   Left cervical lymphadenopathy  Respiratory: Lungs are clear to auscultation bilaterally  Cardiovascular:  Normal rate, normal rhythm, no murmurs, no gallops, no rubs   Musculoskeletal:  No edema   Integument:  Well hydrated, no rash RADIOLOGY/PROCEDURES    No orders to display         Summation      Patient Course: Patient will be sent home. Patient is prescribed Augmentin. The warning signs were discussed. ED Medications administered this visit:  Medications - No data to display    New Prescriptions from this visit:    New Prescriptions    AMOXICILLIN-CLAVULANATE (AUGMENTIN) 500-125 MG PER TABLET    Take 1 tablet by mouth 3 times daily for 10 days    NAPROXEN (NAPROSYN) 375 MG TABLET    Take 1 tablet by mouth 2 times daily (with meals)       Follow-up:  No follow-up provider specified. Final Impression:   1. Cervical lymphadenopathy    2.  Acute pharyngitis, unspecified etiology               (Please note that portions of this note were completed with a voice recognition program.  Efforts were made to edit the dictations but occasionally words are mis-transcribed.)          Sarahy Treviño MD  06/23/19 5430

## 2019-11-09 ENCOUNTER — HOSPITAL ENCOUNTER (EMERGENCY)
Age: 49
Discharge: HOME | End: 2019-11-09
Attending: EMERGENCY MEDICINE
Payer: COMMERCIAL

## 2019-11-09 VITALS
BODY MASS INDEX: 27.79 KG/M2 | RESPIRATION RATE: 18 BRPM | DIASTOLIC BLOOD PRESSURE: 69 MMHG | SYSTOLIC BLOOD PRESSURE: 126 MMHG | HEART RATE: 83 BPM | WEIGHT: 151 LBS | HEIGHT: 62 IN | TEMPERATURE: 98.3 F | OXYGEN SATURATION: 95 %

## 2019-11-09 DIAGNOSIS — R09.81 SINUS CONGESTION: Primary | ICD-10-CM

## 2019-11-09 DIAGNOSIS — N30.00 ACUTE CYSTITIS WITHOUT HEMATURIA: ICD-10-CM

## 2019-11-09 LAB
-: ABNORMAL
AMORPHOUS: ABNORMAL
BACTERIA: ABNORMAL
BILIRUBIN URINE: NEGATIVE
CASTS UA: ABNORMAL /LPF
COLOR: YELLOW
COMMENT UA: ABNORMAL
CRYSTALS, UA: ABNORMAL /HPF
EPITHELIAL CELLS UA: ABNORMAL /HPF
GLUCOSE URINE: NEGATIVE
KETONES, URINE: NEGATIVE
LEUKOCYTE ESTERASE, URINE: ABNORMAL
MUCUS: ABNORMAL
NITRITE, URINE: NEGATIVE
OTHER OBSERVATIONS UA: ABNORMAL
PH UA: 7 (ref 5–8)
PROTEIN UA: NEGATIVE
RBC UA: ABNORMAL /HPF (ref 0–2)
RENAL EPITHELIAL, UA: ABNORMAL /HPF
SPECIFIC GRAVITY UA: 1.01 (ref 1–1.03)
TRICHOMONAS: ABNORMAL
TURBIDITY: CLEAR
URINE HGB: NEGATIVE
UROBILINOGEN, URINE: NORMAL
WBC UA: ABNORMAL /HPF
YEAST: ABNORMAL

## 2019-11-09 PROCEDURE — 87086 URINE CULTURE/COLONY COUNT: CPT

## 2019-11-09 PROCEDURE — 81001 URINALYSIS AUTO W/SCOPE: CPT

## 2019-11-09 PROCEDURE — 99283 EMERGENCY DEPT VISIT LOW MDM: CPT

## 2019-11-09 RX ORDER — SULFAMETHOXAZOLE AND TRIMETHOPRIM 800; 160 MG/1; MG/1
1 TABLET ORAL 2 TIMES DAILY
Qty: 14 TABLET | Refills: 0 | Status: SHIPPED | OUTPATIENT
Start: 2019-11-09 | End: 2019-11-16

## 2019-11-09 RX ORDER — OXYMETAZOLINE HYDROCHLORIDE 0.05 G/100ML
2 SPRAY NASAL 2 TIMES DAILY
Qty: 1 BOTTLE | Refills: 3 | Status: SHIPPED | OUTPATIENT
Start: 2019-11-09 | End: 2019-12-09

## 2019-11-09 ASSESSMENT — ENCOUNTER SYMPTOMS
TROUBLE SWALLOWING: 0
SINUS PRESSURE: 1
EYE PAIN: 0
COUGH: 0
NAUSEA: 0
EYE REDNESS: 0
DIARRHEA: 0
VOMITING: 0
SHORTNESS OF BREATH: 0
ABDOMINAL PAIN: 0
SORE THROAT: 0
COLOR CHANGE: 0
SINUS PAIN: 1
BACK PAIN: 0

## 2019-11-11 LAB
CULTURE: NORMAL
Lab: NORMAL
SPECIMEN DESCRIPTION: NORMAL

## 2022-01-05 ENCOUNTER — HOSPITAL ENCOUNTER (EMERGENCY)
Age: 52
Discharge: HOME | End: 2022-01-05
Attending: FAMILY MEDICINE
Payer: COMMERCIAL

## 2022-01-05 VITALS
BODY MASS INDEX: 27.96 KG/M2 | OXYGEN SATURATION: 97 % | DIASTOLIC BLOOD PRESSURE: 71 MMHG | RESPIRATION RATE: 18 BRPM | WEIGHT: 142.44 LBS | HEART RATE: 84 BPM | HEIGHT: 60 IN | TEMPERATURE: 98.2 F | SYSTOLIC BLOOD PRESSURE: 130 MMHG

## 2022-01-05 DIAGNOSIS — J01.90 SUBACUTE SINUSITIS, UNSPECIFIED LOCATION: Primary | ICD-10-CM

## 2022-01-05 PROCEDURE — 99283 EMERGENCY DEPT VISIT LOW MDM: CPT

## 2022-01-05 RX ORDER — FLUTICASONE PROPIONATE 50 MCG
2 SPRAY, SUSPENSION (ML) NASAL DAILY
Qty: 48 G | Refills: 1 | Status: SHIPPED | OUTPATIENT
Start: 2022-01-05 | End: 2022-02-10

## 2022-01-05 RX ORDER — AMOXICILLIN AND CLAVULANATE POTASSIUM 875; 125 MG/1; MG/1
1 TABLET, FILM COATED ORAL 2 TIMES DAILY
Qty: 20 TABLET | Refills: 0 | Status: SHIPPED | OUTPATIENT
Start: 2022-01-05 | End: 2022-01-15

## 2022-01-05 ASSESSMENT — PAIN DESCRIPTION - LOCATION: LOCATION: HEAD;FACE;EAR

## 2022-01-05 ASSESSMENT — PAIN DESCRIPTION - DESCRIPTORS: DESCRIPTORS: ACHING

## 2022-01-05 ASSESSMENT — PAIN DESCRIPTION - ORIENTATION: ORIENTATION: RIGHT;LEFT

## 2022-01-05 ASSESSMENT — PAIN DESCRIPTION - PAIN TYPE: TYPE: ACUTE PAIN

## 2022-01-05 ASSESSMENT — PAIN SCALES - GENERAL: PAINLEVEL_OUTOF10: 7

## 2022-01-05 NOTE — LETTER
[facility] ED  [address], [address]  Phone: [phone]               January 5, 2022    Patient: Talha Chavez   YOB: 1970   Date of Visit: 1/5/2022       To Whom It May Concern: Talha Chavez was seen and treated in our emergency department on 1/5/2022. She may return to work on 1/6/2022.       Sincerely,       Renny Schuler RN         Signature:__________________________________

## 2022-01-06 NOTE — ED PROVIDER NOTES
[address] [facility]  eMERGENCY dEPARTMENT eNCOUnter          [address] [facility]       Chief Complaint   Patient presents with    Sinusitis     Pt has had sinus related symptoms for past 3 weeks, pt has tried to see PCP but it has been one thing after the other for her. Nurses Notes reviewed and I agree except as noted in the HPI. HISTORY OF PRESENT ILLNESS    Ramana Diaz is a 46 y.o. female who presents to the emergency room via private vehicle, patient complaining of sinus infection for past 3 weeks, denies any fever, has noted some left ear and jaw discomfort, has had some rhinorrhea postnasal drainage, occasional cough. Patient has not received the Covid or flu vaccine, patient is a smoker. Patient rates her discomfort 7 out of 10    REVIEW OF SYSTEMS     Review of Systems   All other systems reviewed and are negative. PAST MEDICAL HISTORY    has a past medical history of Asthma. SURGICAL HISTORY      has a past surgical history that includes Hysterectomy; Tubal ligation; Cholecystectomy; and shoulder surgery. CURRENT MEDICATIONS       Discharge Medication List as of 1/5/2022 10:58 AM      CONTINUE these medications which have NOT CHANGED    Details   !! fluticasone (FLONASE) 50 MCG/ACT nasal spray 2 sprays by Nasal route dailyHistorical Med      Aspirin Buf,HaLem-GuZdp-TjDyt, (BUFFERED ASPIRIN) 325 MG TABS Take 1 tablet by mouth dailyHistorical Med      ibuprofen (IBU) 600 MG tablet Take 600 mg by mouth every 6 hours as neededHistorical Med       !! - Potential duplicate medications found. Please discuss with provider. ALLERGIES     is allergic to adhesive tape, codeine, demerol hcl [meperidine], dexamethasone, hydrocodone-acetaminophen, oxycodone-acetaminophen, and prednisone. FAMILY HISTORY     has no family status information on file. family history is not on file. SOCIAL HISTORY      reports that she has been smoking cigarettes.  She has been smoking about 0.50 packs per day. She has never used smokeless tobacco. She reports that she does not drink alcohol and does not use drugs. PHYSICAL EXAM     INITIAL VITALS:  height is 5' (1.524 m) and weight is 142 lb 7 oz (64.6 kg). Her oral temperature is 98.2 °F (36.8 °C). Her blood pressure is 130/71 and her pulse is 84. Her respiration is 18 and oxygen saturation is 97%. Physical Exam   Constitutional: Patient is oriented to person, place, and time. Patient appears well-developed and well-nourished. Patient is active and cooperative. HENT:   Head: Normocephalic and atraumatic. Head is without contusion. There is across the frontal sinuses and lessor across the bridge of the nose, minimal tenderness maxillary sinuses  Right Ear: Hearing and external ear normal. No drainage. Noted effusion with slight bulging, no erythema  Left Ear: Hearing and external ear normal. No drainage. Noted effusion with bulging, no erythema  Nose: Nose normal. No nasal deformity. No epistaxis. Mouth/Throat: Mucous membranes are not dry. Negative oral lesions or exudate, noted posterior pharyngeal erythema and cobblestoning  Eyes: EOMI. Conjunctivae, sclera, and lids are normal. Right eye exhibits no discharge. Left eye exhibits no discharge. Neck: Full passive range of motion without pain and phonation normal.   Cardiovascular:  Normal rate, regular rhythm and intact distal pulses. Pulses: Right radial pulse  2+   Pulmonary/Chest: Effort normal. No tachypnea and no bradypnea. No wheezes, rhonchi, or rales. Abdominal: Soft. Patient without distension   Musculoskeletal:   Negative acute trauma or deformity,  apparent full range of motion and normal strength all extremities appropriate to age. Neurological: Patient is alert and oriented to person, place, and time. patient displays no tremor. Patient displays no seizure activity. Skin: Skin is warm and dry. Patient is not diaphoretic.   Psychiatric: Patient has a normal mood and affect. Patient speech is normal and behavior is normal. Cognition and memory are normal.    DIFFERENTIAL DIAGNOSIS:   Sinusitis, URI    DIAGNOSTIC RESULTS           RADIOLOGY: non-plain film images(s) such as CT, Ultrasound and MRI are read by the radiologist.  No orders to display       LABS:   Labs Reviewed - No data to display    EMERGENCY DEPARTMENT COURSE:   Vitals:    Vitals:    01/05/22 0956   BP: 130/71   Pulse: 84   Resp: 18   Temp: 98.2 °F (36.8 °C)   TempSrc: Oral   SpO2: 97%   Weight: 142 lb 7 oz (64.6 kg)   Height: 5' (1.524 m)     Patient history and physical exam taken at bedside, discussed patient symptoms and exam findings, discussed patient's timeline of infection, being 3 weeks, at this time patient meets [facility] for prescription antibiotics for sinusitis, confirmed allergies, initiate patient on Augmentin, we also discussed using some Flonase as patient has an ear effusion, follow-up with primary care, return to ER symptoms change worse other concerns, acknowledged    Patient encouraged to stop smoking    FINAL IMPRESSION      1. Subacute sinusitis, unspecified location          DISPOSITION/PLAN   D/c    PATIENT REFERRED TO:  EDER Link - CNP  [facility] [address] [place] [address]  [phone]    Call       [facility] ED  [address] [address]  [phone]    As needed      DISCHARGE MEDICATIONS:  Discharge Medication List as of 1/5/2022 10:58 AM      START taking these medications    Details   amoxicillin-clavulanate (AUGMENTIN) 875-125 MG per tablet Take 1 tablet by mouth 2 times daily for 10 days, Disp-20 tablet, R-0Normal      !! fluticasone (FLONASE) 50 MCG/ACT nasal spray 2 sprays by Each Nostril route daily, Disp-48 g, R-1Normal       !! - Potential duplicate medications found. Please discuss with provider.               Summation      Patient Course:  D/c    ED Medications administered this visit:  Medications - No data to display    New Prescriptions from this visit:    Discharge Medication List as of 1/5/2022 10:58 AM      START taking these medications    Details   amoxicillin-clavulanate (AUGMENTIN) 875-125 MG per tablet Take 1 tablet by mouth 2 times daily for 10 days, Disp-20 tablet, R-0Normal      !! fluticasone (FLONASE) 50 MCG/ACT nasal spray 2 sprays by Each Nostril route daily, Disp-48 g, R-1Normal       !! - Potential duplicate medications found. Please discuss with provider. Follow-up:  Tavo Warner, APRN - CNP  [facility] [address] [facility]  [phone]    Call       [facility] ED  [address] [facility] [address] [phone]    As needed        Final Impression:   1.  Subacute sinusitis, unspecified location               (Please note that portions of this note were completed with a voice recognition program.  Efforts were made to edit the dictations but occasionally words are mis-transcribed.)    MD Rowan Zavala MD  01/06/22 5992

## 2022-01-30 ENCOUNTER — HOSPITAL ENCOUNTER (EMERGENCY)
Age: 52
Discharge: HOME | End: 2022-01-30
Attending: EMERGENCY MEDICINE
Payer: COMMERCIAL

## 2022-01-30 VITALS
TEMPERATURE: 97.9 F | BODY MASS INDEX: 27.29 KG/M2 | WEIGHT: 139 LBS | DIASTOLIC BLOOD PRESSURE: 76 MMHG | OXYGEN SATURATION: 98 % | RESPIRATION RATE: 20 BRPM | SYSTOLIC BLOOD PRESSURE: 115 MMHG | HEART RATE: 84 BPM | HEIGHT: 60 IN

## 2022-01-30 DIAGNOSIS — J01.01 ACUTE RECURRENT MAXILLARY SINUSITIS: Primary | ICD-10-CM

## 2022-01-30 PROCEDURE — 99283 EMERGENCY DEPT VISIT LOW MDM: CPT

## 2022-01-30 RX ORDER — ECHINACEA PURPUREA EXTRACT 125 MG
1 TABLET ORAL PRN
Qty: 1 EACH | Refills: 1 | Status: SHIPPED | OUTPATIENT
Start: 2022-01-30 | End: 2022-08-18 | Stop reason: ALTCHOICE

## 2022-01-30 RX ORDER — AMOXICILLIN 500 MG/1
1000 CAPSULE ORAL 2 TIMES DAILY
Qty: 40 CAPSULE | Refills: 0 | Status: SHIPPED | OUTPATIENT
Start: 2022-01-30 | End: 2022-02-09

## 2022-01-30 RX ORDER — OXYMETAZOLINE HYDROCHLORIDE 0.05 G/100ML
2 SPRAY NASAL 2 TIMES DAILY
Qty: 1 EACH | Refills: 0 | Status: SHIPPED | OUTPATIENT
Start: 2022-01-30 | End: 2022-02-02

## 2022-01-30 ASSESSMENT — PAIN DESCRIPTION - DESCRIPTORS: DESCRIPTORS: ACHING;CONSTANT

## 2022-01-30 ASSESSMENT — PAIN DESCRIPTION - PROGRESSION: CLINICAL_PROGRESSION: NOT CHANGED

## 2022-01-30 ASSESSMENT — PAIN DESCRIPTION - ONSET: ONSET: ON-GOING

## 2022-01-30 ASSESSMENT — PAIN DESCRIPTION - PAIN TYPE: TYPE: ACUTE PAIN

## 2022-01-30 ASSESSMENT — PAIN SCALES - GENERAL: PAINLEVEL_OUTOF10: 8

## 2022-01-30 ASSESSMENT — PAIN DESCRIPTION - FREQUENCY: FREQUENCY: CONTINUOUS

## 2022-01-30 NOTE — ED PROVIDER NOTES
eMERGENCY dEPARTMENT eNCOUnter        [address] [facility]    Chief Complaint   Patient presents with    Sinusitis     \"scene on Jan 5 for sinus infection. Took entire prescription of Augmentin. Not feeling any better. Earlist to family Doctor is in over a week. \" \"face hurt, teeth, and jaws\"       HPI    Jeanette Sevilla is a 46 y.o. female who presents to ED from home. By car. With complaint of sinus congestion sinus pressure. Burning and pain in her teeth and jaw. Onset for the past couple weeks. Patient was seen on January 5 25 days ago and prescribed a course of antibiotic, Augmentin. Patient patient continues to have pressure and congestion of the sinuses  Intensity of symptoms moderate pain and pressure. Location of symptoms sinuses. Patient denies shortness of breath. Patient is allergic to prednisone, Decadron. Patient is a smoker. Patient smokes half a pack a day.   REVIEW OF SYSTEMS    All systems reviewed and positives are in the HPI      PAST MEDICAL HISTORY    Past Medical History:   Diagnosis Date    Asthma        SURGICAL HISTORY    Past Surgical History:   Procedure Laterality Date    CHOLECYSTECTOMY      HYSTERECTOMY      SHOULDER SURGERY      x 3, left    TUBAL LIGATION         CURRENT MEDICATIONS    Current Outpatient Rx   Medication Sig Dispense Refill    amoxicillin (AMOXIL) 500 MG capsule Take 2 capsules by mouth 2 times daily for 10 days 40 capsule 0    sodium chloride (OCEAN) 0.65 % nasal spray 1 spray by Nasal route as needed for Congestion 1 each 1    oxymetazoline (AFRIN 12 HOUR) 0.05 % nasal spray 2 sprays by Nasal route 2 times daily for 3 days 1 each 0    fluticasone (FLONASE) 50 MCG/ACT nasal spray 2 sprays by Each Nostril route daily 48 g 1    Aspirin Buf,SoXim-QcZis-ZgGnq, (BUFFERED ASPIRIN) 325 MG TABS Take 1 tablet by mouth daily      ibuprofen (IBU) 600 MG tablet Take 600 mg by mouth every 6 hours as needed      fluticasone (FLONASE) 50 MCG/ACT nasal spray 2 sprays by Nasal route daily         ALLERGIES    Allergies   Allergen Reactions    Adhesive Tape Other (See Comments)    Codeine Other (See Comments)    Demerol Hcl [Meperidine] Hives    Dexamethasone Swelling    Hydrocodone-Acetaminophen Other (See Comments) and Swelling    Oxycodone-Acetaminophen Other (See Comments) and Swelling    Prednisone Swelling       FAMILY HISTORY    History reviewed. No pertinent family history. SOCIAL HISTORY    Social History     Socioeconomic History    Marital status:      Spouse name: None    Number of children: None    Years of education: None    Highest education level: None   Occupational History    None   Tobacco Use    Smoking status: Current Every Day Smoker     Packs/day: 0.50     Types: Cigarettes     Last attempt to quit: 3/29/2013     Years since quittin.8    Smokeless tobacco: Never Used   Substance and Sexual Activity    Alcohol use: No    Drug use: No    Sexual activity: None   Other Topics Concern    None   Social History Narrative    None     Social Determinants of Health     Financial Resource Strain:     Difficulty of Paying Living Expenses: Not on file   Food Insecurity:     Worried About Running Out of Food in the Last Year: Not on file    Yojana of Food in the Last Year: Not on file   Transportation Needs:     Lack of Transportation (Medical): Not on file    Lack of Transportation (Non-Medical):  Not on file   Physical Activity:     Days of Exercise per Week: Not on file    Minutes of Exercise per Session: Not on file   Stress:     Feeling of Stress : Not on file   Social Connections:     Frequency of Communication with Friends and Family: Not on file    Frequency of Social Gatherings with Friends and Family: Not on file    Attends Shinto Services: Not on file    Active Member of Clubs or Organizations: Not on file    Attends Club or Organization Meetings: Not on file    Marital Status: Not on file   Intimate Partner Violence:     Fear of Current or Ex-Partner: Not on file    Emotionally Abused: Not on file    Physically Abused: Not on file    Sexually Abused: Not on file   Housing Stability:     Unable to Pay for Housing in the Last Year: Not on file    Number of [place] in the Last Year: Not on file    Unstable Housing in the Last Year: Not on file       PHYSICAL EXAM    VITAL SIGNS: /76   Temp 97.9 °F (36.6 °C) (Oral)   Resp 20   Ht 5' (1.524 m)   Wt 139 lb (63 kg)   SpO2 98%   BMI 27.15 kg/m²   Constitutional:  Well developed, well nourished, no acute distress, non-toxic appearance   Eyes: PERRL, conjunctiva normal   HENT: Nasal congestion postnasal drainage maxillary sinus tenderness to palpation, frontal sinuses tenderness. Respiratory: Clear to auscultation by auscultation bilateral   cardiovascular:  Normal rate, normal rhythm, no murmurs, no gallops, no rubs   Musculoskeletal:  No edema   Integument:  Well hydrated, no rash     RADIOLOGY/PROCEDURES    No orders to display         Summation      Patient Course: Patient will be sent home. Irrigations were recommended every 4 hours. ED Medications administered this visit:  Medications - No data to display    New Prescriptions from this visit:    New Prescriptions    AMOXICILLIN (AMOXIL) 500 MG CAPSULE    Take 2 capsules by mouth 2 times daily for 10 days    OXYMETAZOLINE (AFRIN 12 HOUR) 0.05 % NASAL SPRAY    2 sprays by Nasal route 2 times daily for 3 days    SODIUM CHLORIDE (OCEAN) 0.65 % NASAL SPRAY    1 spray by Nasal route as needed for Congestion       Follow-up:  No follow-up provider specified. Final Impression:   1.  Acute recurrent maxillary sinusitis               (Please note that portions of this note were completed with a voice recognition program.  Efforts were made to edit the dictations but occasionally words are mis-transcribed.)        Titi Regalado MD  01/31/22 5727

## 2022-02-10 ENCOUNTER — OFFICE VISIT (OUTPATIENT)
Dept: FAMILY MEDICINE CLINIC | Age: 52
End: 2022-02-10
Payer: COMMERCIAL

## 2022-02-10 VITALS
HEIGHT: 62 IN | SYSTOLIC BLOOD PRESSURE: 136 MMHG | BODY MASS INDEX: 25.8 KG/M2 | WEIGHT: 140.2 LBS | RESPIRATION RATE: 20 BRPM | HEART RATE: 80 BPM | DIASTOLIC BLOOD PRESSURE: 84 MMHG

## 2022-02-10 DIAGNOSIS — E66.3 OVERWEIGHT (BMI 25.0-29.9): ICD-10-CM

## 2022-02-10 DIAGNOSIS — H40.213: ICD-10-CM

## 2022-02-10 DIAGNOSIS — Z13.1 SCREENING FOR DIABETES MELLITUS: ICD-10-CM

## 2022-02-10 DIAGNOSIS — F17.210 NICOTINE DEPENDENCE, CIGARETTES, UNCOMPLICATED: ICD-10-CM

## 2022-02-10 DIAGNOSIS — I34.0 NONRHEUMATIC MITRAL VALVE REGURGITATION: ICD-10-CM

## 2022-02-10 DIAGNOSIS — Z13.220 SCREENING FOR HYPERLIPIDEMIA: ICD-10-CM

## 2022-02-10 DIAGNOSIS — B96.89 ACUTE BACTERIAL SINUSITIS: ICD-10-CM

## 2022-02-10 DIAGNOSIS — K21.9 GASTROESOPHAGEAL REFLUX DISEASE WITHOUT ESOPHAGITIS: Primary | ICD-10-CM

## 2022-02-10 DIAGNOSIS — Z00.00 ENCOUNTER FOR MEDICAL EXAMINATION TO ESTABLISH CARE: ICD-10-CM

## 2022-02-10 DIAGNOSIS — J01.90 ACUTE BACTERIAL SINUSITIS: ICD-10-CM

## 2022-02-10 LAB — HBA1C MFR BLD: 5.7 %

## 2022-02-10 PROCEDURE — 99204 OFFICE O/P NEW MOD 45 MIN: CPT | Performed by: STUDENT IN AN ORGANIZED HEALTH CARE EDUCATION/TRAINING PROGRAM

## 2022-02-10 PROCEDURE — 83036 HEMOGLOBIN GLYCOSYLATED A1C: CPT | Performed by: STUDENT IN AN ORGANIZED HEALTH CARE EDUCATION/TRAINING PROGRAM

## 2022-02-10 RX ORDER — DOXYCYCLINE HYCLATE 100 MG
100 TABLET ORAL 2 TIMES DAILY
Qty: 20 TABLET | Refills: 0 | Status: SHIPPED | OUTPATIENT
Start: 2022-02-10 | End: 2022-02-20

## 2022-02-10 RX ORDER — LATANOPROST 50 UG/ML
SOLUTION/ DROPS OPHTHALMIC
COMMUNITY
Start: 2022-02-07

## 2022-02-10 SDOH — ECONOMIC STABILITY: FOOD INSECURITY: WITHIN THE PAST 12 MONTHS, YOU WORRIED THAT YOUR FOOD WOULD RUN OUT BEFORE YOU GOT MONEY TO BUY MORE.: NEVER TRUE

## 2022-02-10 SDOH — ECONOMIC STABILITY: FOOD INSECURITY: WITHIN THE PAST 12 MONTHS, THE FOOD YOU BOUGHT JUST DIDN'T LAST AND YOU DIDN'T HAVE MONEY TO GET MORE.: NEVER TRUE

## 2022-02-10 ASSESSMENT — PATIENT HEALTH QUESTIONNAIRE - PHQ9
SUM OF ALL RESPONSES TO PHQ QUESTIONS 1-9: 0
2. FEELING DOWN, DEPRESSED OR HOPELESS: 0
SUM OF ALL RESPONSES TO PHQ QUESTIONS 1-9: 0
SUM OF ALL RESPONSES TO PHQ9 QUESTIONS 1 & 2: 0
1. LITTLE INTEREST OR PLEASURE IN DOING THINGS: 0
SUM OF ALL RESPONSES TO PHQ QUESTIONS 1-9: 0
SUM OF ALL RESPONSES TO PHQ QUESTIONS 1-9: 0

## 2022-02-10 ASSESSMENT — ENCOUNTER SYMPTOMS
COUGH: 0
BACK PAIN: 0
RHINORRHEA: 0
WHEEZING: 0
ABDOMINAL PAIN: 0
SINUS PAIN: 0
VOMITING: 0
DIARRHEA: 0
NAUSEA: 0

## 2022-02-10 ASSESSMENT — SOCIAL DETERMINANTS OF HEALTH (SDOH): HOW HARD IS IT FOR YOU TO PAY FOR THE VERY BASICS LIKE FOOD, HOUSING, MEDICAL CARE, AND HEATING?: NOT HARD AT ALL

## 2022-02-10 NOTE — PROGRESS NOTES
HPI Notes    Name: Saloni Noonan  : 1970         Chief Complaint:     Chief Complaint   Patient presents with    New Patient     Cierra Novak CNP at [facility] in  [place] [address] Sinus Problem     ED on  and  for sinus infection was given nasal spray and nasal saline states she is unsure which one but it made her tongue swell  . states she is still having drainage and RT sided ear pain and teeth pain     Weight Loss     would like to discuss her weight     Labs Only     would like lab work done states its been over a year        History of Present Illness:      HPI    This is a 59-year-old woman with medical history significant for nicotine dependence, gastroesophageal reflux disease, and recent bacterial sinusitis presenting to establish care with new primary care physician. Her previous primary care provider was Cierra Novak CNP at [facility] in [place]. She is specifically wanting to follow-up on her recent sinus problems and to discuss weight loss. Sinus problem: wakes coughing, still has pressure in the maxillary and frontal sinuses. Has to constantly clear the throat. She uses Sudafed twice daily. She has not appreciably improved. Overweight: she is not satisfied with her current weight. She would like to weight about 120 lb. She is dissatisfied with abdominal fat gain. She is currently doing 10 min \"toning exercises\" for abdominal exercise and is eating fruits and salads for lunch. She skips breakfast. She eats salads and baked fish for dinner. She feels that she has gained too much weight from about 2-3 years ago. She would also like to discuss ordering lab work since it has been over 12 months since she has had this performed. Additional medical history is as follows    Nicotine dependence: 0.5 PPD smoking, stress relieving activity for her. GERD: not currently managed medically and is not bothersome for her.      Glaucoma: historically noted as closed angle glaucoma. Had emergency surgery and this improved both her vision and migraine headaches. Persistent H. pylori infection: She reports that she has been scoped three times and each time found H. pylori in the stomach. She was treated with antibiotic therapy and proton pump inhibitor three times, but this did never clear the infection. She is not currently treated. She is not currently symptomatic. Other than \"bloating after eating a cracker\"    She also has a history of high cholesterol but developed myalgias from Crestor. She is currently untreated. Past Medical History:     Past Medical History:   Diagnosis Date    Asthma     Glaucoma     Closed fior Glaucoma      Reviewed all health maintenance requirements and ordered appropriate tests  Health Maintenance Due   Topic Date Due    COVID-19 Vaccine (1) Never done    Depression Screen  Never done    Lipid screen  Never done    Colon cancer screen colonoscopy  Never done    Breast cancer screen  Never done    Shingles Vaccine (1 of 2) Never done    Flu vaccine (1) 09/01/2021       Past Surgical History:     Past Surgical History:   Procedure Laterality Date    CARPAL TUNNEL RELEASE      CHOLECYSTECTOMY      HYSTERECTOMY      SHOULDER SURGERY      x 3, left    TUBAL LIGATION          Medications:       Prior to Admission medications    Medication Sig Start Date End Date Taking?  Authorizing Provider   Magnesium Cl-Calcium Carbonate (SLOW-MAG PO) Take by mouth Takes 1 tab in the am and 1 tab in the PM   Yes Historical Provider, MD   doxycycline hyclate (VIBRA-TABS) 100 MG tablet Take 1 tablet by mouth 2 times daily for 10 days 2/10/22 2/20/22 Yes Justa Rodríguez,    ibuprofen (IBU) 600 MG tablet Take 600 mg by mouth every 6 hours as needed   Yes Historical Provider, MD   latanoprost (XALATAN) 0.005 % ophthalmic solution  2/7/22   Historical Provider, MD   sodium chloride (OCEAN) 0.65 % nasal spray 1 spray by Nasal route as needed for Congestion 1/30/22   Angus Goodwin MD   fluticasone [facility] [place]) 50 MCG/ACT nasal spray 2 sprays by Nasal route daily 4/1/19 1/5/22  Historical Provider, MD        Allergies:       Adhesive tape, Codeine, Demerol hcl [meperidine], Dexamethasone, Hydrocodone-acetaminophen, Oxycodone-acetaminophen, and Prednisone    Social History:     Tobacco:    reports that she has been smoking cigarettes. She has a 15.00 pack-year smoking history. She has never used smokeless tobacco.  Alcohol:      reports previous alcohol use. Drug Use:  reports no history of drug use. Family History:     Family History   Problem Relation Age of Onset    Thyroid Disease Mother     High Blood Pressure Mother     Cancer Mother     Cancer Father     Hypotension Sister     Lupus Sister     High Blood Pressure Brother     High Blood Pressure Brother        Review of Systems:       Review of Systems   Constitutional: Negative for fever. HENT: Positive for congestion and sinus pressure. Negative for rhinorrhea, sinus pain and sneezing. Respiratory: Negative for cough and wheezing. Cardiovascular: Negative for chest pain. Gastrointestinal: Negative for abdominal pain, diarrhea, nausea and vomiting. Musculoskeletal: Negative for back pain. Skin: Negative for rash. Neurological: Negative for headaches. Psychiatric/Behavioral: Negative for sleep disturbance. Physical Exam:     Vitals:  /84 (Site: Left Upper Arm, Position: Sitting, Cuff Size: Medium Adult)   Pulse 80   Resp 20   Ht 5' 2\" (1.575 m)   Wt 140 lb 3.2 oz (63.6 kg)   BMI 25.64 kg/m²     Physical Exam  Vitals and nursing note reviewed. Constitutional:       General: She is not in acute distress. Appearance: Normal appearance. HENT:      Right Ear: Tympanic membrane, ear canal and external ear normal. There is no impacted cerumen. Left Ear: Tympanic membrane, ear canal and external ear normal. There is no impacted cerumen.       Nose: Nose normal.      Mouth/Throat:      Mouth: Mucous membranes are moist.      Pharynx: Oropharynx is clear. No posterior oropharyngeal erythema. Eyes:      Conjunctiva/sclera: Conjunctivae normal.      Pupils: Pupils are equal, round, and reactive to light. Cardiovascular:      Rate and Rhythm: Normal rate and regular rhythm. Pulses: Normal pulses. Heart sounds: Normal heart sounds. No murmur heard. Pulmonary:      Effort: Pulmonary effort is normal.      Breath sounds: Normal breath sounds. No wheezing. Abdominal:      General: Abdomen is flat. Palpations: Abdomen is soft. Tenderness: There is no abdominal tenderness. Musculoskeletal:      Cervical back: Normal range of motion and neck supple. Skin:     General: Skin is warm and dry. Capillary Refill: Capillary refill takes less than 2 seconds. Neurological:      General: No focal deficit present. Mental Status: She is alert and oriented to person, place, and time. Mental status is at baseline. Cranial Nerves: No cranial nerve deficit. Psychiatric:         Mood and Affect: Mood normal.         Behavior: Behavior normal.         Thought Content: Thought content normal.       Osteopathic: right maxillary sinus boggy, tender to palpations.        Data:     Lab Results   Component Value Date     01/24/2018    K 3.8 01/24/2018     01/24/2018    CO2 25 01/24/2018    BUN 11 01/24/2018    CREATININE 0.66 01/24/2018    GLUCOSE 96 01/24/2018    PROT 7.7 04/29/2013    LABALBU 4.7 04/29/2013    BILITOT 0.29 04/29/2013    ALKPHOS 127 04/29/2013    AST 59 04/29/2013     04/29/2013     Lab Results   Component Value Date    WBC 7.8 01/24/2018    RBC 4.45 01/24/2018    HGB 13.3 01/24/2018    HCT 39.9 01/24/2018    MCV 89.6 01/24/2018    MCH 30.0 01/24/2018    MCHC 33.4 01/24/2018    RDW 14.5 01/24/2018     01/24/2018    MPV NOT REPORTED 01/24/2018     No results found for: TSH  Lab Results   Component Value Date    LABA1C 5.7 02/10/2022          Assessment & Plan        Diagnosis Orders   1. Gastroesophageal reflux disease without esophagitis     2. Acute closed-angle glaucoma, bilateral     3. Nonrheumatic mitral valve regurgitation     4. Overweight (BMI 25.0-29. 9)  [address] [place] [address]   5. Nicotine dependence, cigarettes, uncomplicated     6. Encounter for medical examination to establish care     7. Screening for hyperlipidemia  Lipid Panel   8. Screening for diabetes mellitus  POCT glycosylated hemoglobin (Hb A1C)   9. Acute bacterial sinusitis  doxycycline hyclate (VIBRA-TABS) 100 MG tablet       1. Not currently medically managed, as this is not bothersome for her. 2. Historically noted as closed angle glaucoma. Had emergency surgery and this improved both her vision and migraine headaches. 3.  Cervical problem. 4.  Historical problem, noted. She is interested in referral to outpatient nutrition services. 5.  Contemplative regarding cessation. 7.  We will order lipid panel. 8.  Order screening hemoglobin A1c.    9.  We will treat with doxycycline, and perform OMT at next OV. Completed Refills   Requested Prescriptions     Signed Prescriptions Disp Refills    doxycycline hyclate (VIBRA-TABS) 100 MG tablet 20 tablet 0     Sig: Take 1 tablet by mouth 2 times daily for 10 days     Return in about 6 days (around 2/16/2022) for OMT sinus (may be 20 min).   Orders Placed This Encounter   Medications    doxycycline hyclate (VIBRA-TABS) 100 MG tablet     Sig: Take 1 tablet by mouth 2 times daily for 10 days     Dispense:  20 tablet     Refill:  0     Orders Placed This Encounter   Procedures    Lipid Panel     Standing Status:   Future     Standing Expiration Date:   2/10/2023     Order Specific Question:   Is Patient Fasting?/# of Hours     Answer:   yes/10   [address] [place] [address]     Referral Priority:   Routine     Referral Type:   Eval and Treat     Referral Reason:   Specialty Services Required     Requested Specialty:   Nutrition     Number of Visits Requested:   1    POCT glycosylated hemoglobin (Hb A1C)         Patient Instructions   Hortensia Tovar,    Thank you for coming to see me today! I believe that our main problem is your recurrent sinus infections. Here's what I would recommend we do: Take doxycycline twice a day for 10 days  Use saline spray in the nose as needed  Come back in 6 days for treatment with OMT for the sinuses    We also discussed your weight. You're really close to being at a healthy weight for your height. Please see the dietician that I'm referring you to. Please review the documents I'm attaching related to what we discussed today. Please give me a call or message me on Kaymu if you have any problems or questions, and I will see you at your next visit. Dr. Lety Humphrey:    Adriano Escobar may be receiving a survey from [employer] regarding your visit today. Please complete the survey to enable us to provide the highest quality of care to you and your family. If you cannot score us a very good on any question, please call the office to discuss how we could of made your experience a very good one. Thank you. Clinical Care Team:     LAWANDA Snell      ClericalTeam:     Chad Garcia      Electronically signed by Kun Viera DO on 2/10/2022 at 4:12 PM           Completed Refills   Requested Prescriptions     Signed Prescriptions Disp Refills    doxycycline hyclate (VIBRA-TABS) 100 MG tablet 20 tablet 0     Sig: Take 1 tablet by mouth 2 times daily for 10 days         Hortensia Tovar received counseling on the following healthy behaviors: nutrition, exercise and medication adherence  Reviewed prior labs and health maintenance. Continue current medications, diet and exercise. Discussed use, benefit, and side effects of prescribed medications. Barriers to medication compliance addressed. Patient given educational materials - see patient instructions. All patient questions answered. Patient voiced understanding.

## 2022-02-10 NOTE — PATIENT INSTRUCTIONS
Katerine Mcleod you for coming to see me today! I believe that our main problem is your recurrent sinus infections. Here's what I would recommend we do: Take doxycycline twice a day for 10 days  Use saline spray in the nose as needed  Come back in 6 days for treatment with OMT for the sinuses    We also discussed your weight. You're really close to being at a healthy weight for your height. Please see the dietician that I'm referring you to. Please review the documents I'm attaching related to what we discussed today. Please give me a call or message me on [employer] if you have any problems or questions, and I will see you at your next visit. Dr. Peng Mcgowan:    Zion Miranda may be receiving a survey from [employer] regarding your visit today. Please complete the survey to enable us to provide the highest quality of care to you and your family. If you cannot score us a very good on any question, please call the office to discuss how we could of made your experience a very good one. Thank you.       Clinical Care Team:     Dr. Kika Sylvester BOB      [facility]:     [id number] [facility]

## 2022-02-12 ENCOUNTER — HOSPITAL ENCOUNTER (OUTPATIENT)
Age: 52
Discharge: HOME | End: 2022-02-12
Payer: COMMERCIAL

## 2022-02-12 DIAGNOSIS — Z13.220 SCREENING FOR HYPERLIPIDEMIA: ICD-10-CM

## 2022-02-12 LAB
CHOLESTEROL/HDL RATIO: 5.2
CHOLESTEROL: 204 MG/DL
HDLC SERPL-MCNC: 39 MG/DL
LDL CHOLESTEROL: 141 MG/DL (ref 0–130)
PATIENT FASTING?: YES
TRIGL SERPL-MCNC: 119 MG/DL
VLDLC SERPL CALC-MCNC: ABNORMAL MG/DL (ref 1–30)

## 2022-02-12 PROCEDURE — 36415 COLL VENOUS BLD VENIPUNCTURE: CPT

## 2022-02-12 PROCEDURE — 80061 LIPID PANEL: CPT

## 2022-02-14 ENCOUNTER — TELEPHONE (OUTPATIENT)
Dept: FAMILY MEDICINE CLINIC | Age: 52
End: 2022-02-14

## 2022-02-14 DIAGNOSIS — R11.2 NON-INTRACTABLE VOMITING WITH NAUSEA, UNSPECIFIED VOMITING TYPE: Primary | ICD-10-CM

## 2022-02-14 NOTE — TELEPHONE ENCOUNTER
Patient states that she had to stop taking her antibiotic because she was vomiting Saturday and Sunday - patient not sure what she should do - please call her after 3:30    Health Maintenance   Topic Date Due    COVID-19 Vaccine (1) Never done    Colon cancer screen colonoscopy  Never done    Breast cancer screen  Never done    Shingles Vaccine (1 of 2) Never done    Flu vaccine (1) 09/01/2021    A1C test (Diabetic or Prediabetic)  02/10/2023    Depression Screen  02/10/2023    Lipid screen  02/12/2027    DTaP/Tdap/Td vaccine (3 - Td or Tdap) 01/20/2031    Pneumococcal 0-64 years Vaccine (2 of 2 - PPSV23) 06/04/2035    Hepatitis A vaccine  Aged Out    Hepatitis B vaccine  Aged Out    Hib vaccine  Aged Out    Meningococcal (ACWY) vaccine  Aged Out    Hepatitis C screen  Discontinued    HIV screen  Discontinued             (applicable per patient's age: Cancer Screenings, Depression Screening, Fall Risk Screening, Immunizations)    Hemoglobin A1C (%)   Date Value   02/10/2022 5.7     LDL Cholesterol (mg/dL)   Date Value   02/12/2022 141 (H)     AST (U/L)   Date Value   04/29/2013 59 (H)     ALT (U/L)   Date Value   04/29/2013 118 (H)     BUN (mg/dL)   Date Value   01/24/2018 11      (goal A1C is < 7)   (goal LDL is <100) need 30-50% reduction from baseline     BP Readings from Last 3 Encounters:   02/10/22 136/84   01/30/22 115/76   01/05/22 130/71    (goal /80)      All Future Testing planned in [facility]:      Next Visit Date:  Future Appointments   Date Time Provider Derik Canales   2/16/2022  4:20 PM DO JERSON Dang [facility]            Patient Active Problem List:     Nicotine dependence, cigarettes, uncomplicated     Acute closed-angle glaucoma, bilateral     Nonrheumatic mitral valve regurgitation     Overweight (BMI 25.0-29. 9)

## 2022-02-14 NOTE — TELEPHONE ENCOUNTER
Discussed case with patient; she reports she is feeling completely better. Her sinus symptoms are improved as well. Follow up PRN for this problem.

## 2022-02-16 ENCOUNTER — OFFICE VISIT (OUTPATIENT)
Dept: FAMILY MEDICINE CLINIC | Age: 52
End: 2022-02-16
Payer: COMMERCIAL

## 2022-02-16 VITALS
DIASTOLIC BLOOD PRESSURE: 80 MMHG | BODY MASS INDEX: 25.88 KG/M2 | WEIGHT: 140.6 LBS | HEIGHT: 62 IN | RESPIRATION RATE: 20 BRPM | HEART RATE: 80 BPM | SYSTOLIC BLOOD PRESSURE: 122 MMHG

## 2022-02-16 DIAGNOSIS — B96.89 ACUTE BACTERIAL SINUSITIS: Primary | ICD-10-CM

## 2022-02-16 DIAGNOSIS — M26.609 TMJ (TEMPOROMANDIBULAR JOINT DISORDER): ICD-10-CM

## 2022-02-16 DIAGNOSIS — M99.00 SOMATIC DYSFUNCTION OF HEAD REGION: ICD-10-CM

## 2022-02-16 DIAGNOSIS — E78.2 MIXED HYPERLIPIDEMIA: ICD-10-CM

## 2022-02-16 DIAGNOSIS — J01.90 ACUTE BACTERIAL SINUSITIS: Primary | ICD-10-CM

## 2022-02-16 PROCEDURE — 98925 OSTEOPATH MANJ 1-2 REGIONS: CPT | Performed by: STUDENT IN AN ORGANIZED HEALTH CARE EDUCATION/TRAINING PROGRAM

## 2022-02-16 NOTE — PROGRESS NOTES
Interval history: This is a 80-year-old woman presenting for treatment of sinus discomfort with OMT. Please see my previous outpatient note for more detailed discussion of this problem.     She is also presenting to discuss her most recent lab work. She has a 5% risk of a cardiovascular event in the next 10 years and is interseted in lowering risk through dietary intervention and rechecking lipid panel in 6 months. /80 (Site: Left Upper Arm, Position: Sitting, Cuff Size: Medium Adult)   Pulse 80   Resp 20   Ht 5' 2\" (1.575 m)   Wt 140 lb 9.6 oz (63.8 kg)   BMI 25.72 kg/m²     Procedure Note:  After history taking and examination of patient, it was determined that a beneficial treatment modality for their complaint would be osteopathic manipulative therapy (OMT) the nature of OMT, treatment goals, mechanism of action, and side effects were extensively discussed with this patient, who did wish to proceed with the procedure. The following body systems were treated with osteopathy during our office visit today: head region. Osteopathic findings include right TMD, right masseter hypertonicity, frontal, ethmoid and maxillary sinus restriction with firm endfeel. After treatment, the patient was reevaluated and the following physical exam findings were appreciated: improvement in sinus endfeel, improved tissue texture change to masseters. Sh also noted improved subjectively described discomfort. We determined that the next best time for the patient to return to the office for additional treatment would be in one week. At this point, her treatment was concluded, there were no complications, there were no further questions. Patient tolerated this procedure very well.     Maribel Ding DO  2/16/2022  4:44 PM

## 2022-02-16 NOTE — PROGRESS NOTES
HPI Notes    Name: Ministerio Dahl  : 1970         Chief Complaint:     Chief Complaint   Patient presents with    Sinus Problem     OMT       History of Present Illness:      HPI    This is a 77-year-old woman presenting for treatment of sinus discomfort with OMT. She is also presenting to discuss her most recent lab work. Past Medical History:     Past Medical History:   Diagnosis Date    Asthma     Glaucoma     Closed fior Glaucoma      Reviewed all health maintenance requirements and ordered appropriate tests  Health Maintenance Due   Topic Date Due    COVID-19 Vaccine (1) Never done    Colorectal Cancer Screen  Never done    Breast cancer screen  Never done    Shingles Vaccine (1 of 2) Never done    Flu vaccine (1) 2021       Past Surgical History:     Past Surgical History:   Procedure Laterality Date    CARPAL TUNNEL RELEASE      CHOLECYSTECTOMY      HYSTERECTOMY      SHOULDER SURGERY      x 3, left    TUBAL LIGATION          Medications:       Prior to Admission medications    Medication Sig Start Date End Date Taking?  Authorizing Provider   Magnesium Cl-Calcium Carbonate (SLOW-MAG PO) Take by mouth Takes 1 tab in the am and 1 tab in the PM   Yes Historical Provider, MD   latanoprost (XALATAN) 0.005 % ophthalmic solution  22  Yes Historical Provider, MD   doxycycline hyclate (VIBRA-TABS) 100 MG tablet Take 1 tablet by mouth 2 times daily for 10 days 2/10/22 2/20/22 Yes Dayna Tolliver DO   sodium chloride (OCEAN) 0.65 % nasal spray 1 spray by Nasal route as needed for Congestion 22  Yes Paco Timmons MD   ibuprofen (IBU) 600 MG tablet Take 600 mg by mouth every 6 hours as needed   Yes Historical Provider, MD   fluticasone (FLONASE) 50 MCG/ACT nasal spray 2 sprays by Nasal route daily 19  Historical Provider, MD        Allergies:       Adhesive tape, Codeine, Demerol hcl [meperidine], Dexamethasone, Hydrocodone-acetaminophen, Oxycodone-acetaminophen, and Prednisone    Social History:     Tobacco:    reports that she has been smoking cigarettes. She has a 15.00 pack-year smoking history. She has never used smokeless tobacco.  Alcohol:      reports previous alcohol use. Drug Use:  reports no history of drug use. Family History:     Family History   Problem Relation Age of Onset    Thyroid Disease Mother     High Blood Pressure Mother     Cancer Mother     Cancer Father     Hypotension Sister     Lupus Sister     High Blood Pressure Brother     High Blood Pressure Brother        Review of Systems:         Review of Systems    ***    Physical Exam:     Vitals:  /80 (Site: Left Upper Arm, Position: Sitting, Cuff Size: Medium Adult)   Pulse 80   Resp 20   Ht 5' 2\" (1.575 m)   Wt 140 lb 9.6 oz (63.8 kg)   BMI 25.72 kg/m²       Physical Exam    ***        Data:     Lab Results   Component Value Date     01/24/2018    K 3.8 01/24/2018     01/24/2018    CO2 25 01/24/2018    BUN 11 01/24/2018    CREATININE 0.66 01/24/2018    GLUCOSE 96 01/24/2018    PROT 7.7 04/29/2013    LABALBU 4.7 04/29/2013    BILITOT 0.29 04/29/2013    ALKPHOS 127 04/29/2013    AST 59 04/29/2013     04/29/2013     Lab Results   Component Value Date    WBC 7.8 01/24/2018    RBC 4.45 01/24/2018    HGB 13.3 01/24/2018    HCT 39.9 01/24/2018    MCV 89.6 01/24/2018    MCH 30.0 01/24/2018    MCHC 33.4 01/24/2018    RDW 14.5 01/24/2018     01/24/2018    MPV NOT REPORTED 01/24/2018     No results found for: TSH  Lab Results   Component Value Date    CHOL 204 02/12/2022    HDL 39 02/12/2022    LABA1C 5.7 02/10/2022          Assessment & Plan       {No diagnosis found. (Refresh or delete this SmartLink)}    ***              Completed Refills   Requested Prescriptions      No prescriptions requested or ordered in this encounter     No follow-ups on file. No orders of the defined types were placed in this encounter.     No orders of the defined types were placed in this encounter. There are no Patient Instructions on file for this visit.     Electronically signed by Francoise Chang DO on 2/16/2022 at 4:43 PM           Completed Refills   Requested Prescriptions      No prescriptions requested or ordered in this encounter         {[facility] Visit Documentation:[id number]}

## 2022-03-01 ENCOUNTER — OFFICE VISIT (OUTPATIENT)
Dept: FAMILY MEDICINE CLINIC | Age: 52
End: 2022-03-01
Payer: COMMERCIAL

## 2022-03-01 VITALS
SYSTOLIC BLOOD PRESSURE: 138 MMHG | BODY MASS INDEX: 25.76 KG/M2 | WEIGHT: 140 LBS | HEART RATE: 80 BPM | HEIGHT: 62 IN | DIASTOLIC BLOOD PRESSURE: 84 MMHG | RESPIRATION RATE: 20 BRPM

## 2022-03-01 DIAGNOSIS — M24.272 LIGAMENTOUS LAXITY OF ANKLE, LEFT: ICD-10-CM

## 2022-03-01 DIAGNOSIS — M25.572 ACUTE LEFT ANKLE PAIN: Primary | ICD-10-CM

## 2022-03-01 PROCEDURE — 99213 OFFICE O/P EST LOW 20 MIN: CPT | Performed by: STUDENT IN AN ORGANIZED HEALTH CARE EDUCATION/TRAINING PROGRAM

## 2022-03-01 ASSESSMENT — ENCOUNTER SYMPTOMS
RHINORRHEA: 0
WHEEZING: 0
BACK PAIN: 0
VOMITING: 0
ABDOMINAL PAIN: 0
DIARRHEA: 0
NAUSEA: 0
SINUS PAIN: 0
COUGH: 0

## 2022-03-01 NOTE — PATIENT INSTRUCTIONS
SURVEY:    You may be receiving a survey from [employer] regarding your visit today. Please complete the survey to enable us to provide the highest quality of care to you and your family. If you cannot score us a very good on any question, please call the office to discuss how we could of made your experience a very good one. Thank you. Clinical Care Team:     Dr. Vivian Morales, [facility]:     [id number] [facility]  Patient Education        Ankle Sprain: Rehab Exercises  Introduction  Here are some examples of exercises for you to try. The exercises may be suggested for a condition or for rehabilitation. Start each exercise slowly. Ease off the exercises if you start to have pain. You will be told when to start these exercises and which ones will work best for you. How to do the exercises  'Alphabet' exercise    1. Trace the alphabet with your toe. This helps your ankle move in all directions. Side-to-side knee swing exercise    1. Sit in a chair with your foot flat on the floor. 2. Slowly move your knee from side to side. Keep your foot pressed flat. 3. Continue this exercise for 2 to 3 minutes. Towel curl    1. While sitting, place your foot on a towel on the floor. Scrunch the towel toward you with your toes. 2. Then use your toes to push the towel away from you. 3. To make this exercise more challenging you can put something on the other end of the towel. A can of soup is about the right weight for this. Towel stretch    1. Sit with your legs extended and knees straight. 2. Place a towel around your foot just under the toes. 3. Hold each end of the towel in each hand, with your hands above your knees. 4. Pull back with the towel so that your foot stretches toward you. 5. Hold the position for at least 15 to 30 seconds. 6. Repeat 2 to 4 times a session. Do up to 5 sessions a day. Ankle eversion exercise    1.  Start by sitting with your foot flat on the floor. Push your foot outward against a wall or a piece of furniture that doesn't move. Hold for about 6 seconds, and relax. Repeat 8 to 12 times. 2. After you feel comfortable with this, try using rubber tubing looped around the outside of your feet for resistance. Push your foot out to the side against the tubing, and then count to 10 as you slowly bring your foot back to the middle. Repeat 8 to 12 times. Isometric opposition exercises    1. While sitting, put your feet together flat on the floor. 2. Press your injured foot inward against your other foot. Hold for about 6 seconds, and relax. Repeat 8 to 12 times. 3. Then place the heel of your other foot on top of the injured one. Push down with the top heel while trying to push up with your injured foot. Hold for about 6 seconds, and relax. Repeat 8 to 12 times. Resisted ankle inversion    1. Sit on the floor with your good leg crossed over your other leg. 2. Hold both ends of an exercise band and loop the band around the inside of your affected foot. Then press your other foot against the band. 3. Keeping your legs crossed, slowly push your affected foot against the band so that foot moves away from your other foot. Then slowly relax. 4. Repeat 8 to 12 times. Resisted ankle eversion    1. Sit on the floor with your legs straight. 2. Hold both ends of an exercise band and loop the band around the outside of your affected foot. Then press your other foot against the band. 3. Keeping your leg straight, slowly push your affected foot outward against the band and away from your other foot without letting your leg rotate. Then slowly relax. 4. Repeat 8 to 12 times. Resisted ankle dorsiflexion    1. Tie the ends of an exercise band together to form a loop. Attach one end of the loop to a secure object or shut a door on it to hold it in place. (Or you can have someone hold one end of the loop to provide resistance.)  2.  While sitting on the floor or in a chair, loop the other end of the band over the top of your affected foot. 3. Keeping your knee and leg straight, slowly flex your foot to pull back on the exercise band, and then slowly relax. 4. Repeat 8 to 12 times. Single-leg balance    1. Stand on a flat surface with your arms stretched out to your sides like you are making the letter \"T. \" Then lift your good leg off the floor, bending it at the knee. If you are not steady on your feet, use one hand to hold on to a chair, counter, or wall. 2. Standing on the leg with your affected ankle, keep that knee straight. Try to balance on that leg for up to 30 seconds. Then rest for up to 10 seconds. 3. Repeat 6 to 8 times. 4. When you can balance on your affected leg for 30 seconds with your eyes open, try to balance on it with your eyes closed. 5. When you can do this exercise with your eyes closed for 30 seconds and with ease and no pain, try standing on a pillow or piece of foam, and repeat steps 1 through 4. Follow-up care is a key part of your treatment and safety. Be sure to make and go to all appointments, and call your doctor if you are having problems. It's also a good idea to know your test results and keep a list of the medicines you take. Where can you learn more? Go to https://chjacksoneb.ShutterCal. org and sign in to your [employer] account. Enter Audrey Oh in the [facility] box to learn more about \"Ankle Sprain: Rehab Exercises. \"     If you do not have an account, please click on the \"Sign Up Now\" link. Current as of: July 1, 2021               Content Version: 13.1  © 9265-8910 Healthwise, Incorporated. Care instructions adapted under license by [facility] ([facility]). If you have questions about a medical condition or this instruction, always ask your healthcare professional. [address] any warranty or liability for your use of this information.          Patient Education        Ankle: Exercises  Introduction  Here are some examples of exercises for you to try. The exercises may be suggested for a condition or for rehabilitation. Start each exercise slowly. Ease off the exercises if you start to have pain. You will be told when to start these exercises and which ones will work best for you. How to do the exercises  'Alphabet' exercise    1. Trace the alphabet with your toe. This helps your ankle move in all directions. Side-to-side knee swing exercise    1. Sit in a chair with your foot flat on the floor. 2. Slowly move your knee from side to side while keeping your foot pressed flat. 3. Continue this exercise for 2 to 3 minutes. Towel curl    1. While sitting, place your foot on a towel on the floor and scrunch the towel toward you with your toes. 2. Then use your toes to push the towel away from you. 3. Make this exercise more challenging by placing a weighted object, such as a soup can, on the other end of the towel. Towel stretch    1. Sit with your legs extended and knees straight. 2. Place a towel around your foot just under the toes. 3. Hold each end of the towel in each hand, with your hands above your knees. 4. Pull back with the towel so that your foot stretches toward you. 5. Hold the position for at least 15 to 30 seconds. 6. Repeat 2 to 4 times a session, up to 5 sessions a day. Ankle eversion exercise    1. Start by sitting with your foot flat on the floor and pushing it outward against an immovable object such as the wall or heavy furniture. Hold for about 6 seconds, then relax. Repeat 8 to 12 times. 2. After you feel comfortable with this, try using rubber tubing looped around the outside of your feet for resistance. Push your foot out to the side against the tubing, and then count to 10 as you slowly bring your foot back to the middle. Repeat 8 to 12 times. Isometric opposition exercises    1. While sitting, put your feet together flat on the floor.   2. Press your injured foot inward against your other foot. Hold for about 6 seconds, and relax. Repeat 8 to 12 times. 3. Then place the heel of your other foot on top of the injured one. Push down with the top heel while trying to push up with your injured foot. Hold for about 6 seconds, and relax. Repeat 8 to 12 times. Follow-up care is a key part of your treatment and safety. Be sure to make and go to all appointments, and call your doctor if you are having problems. It's also a good idea to know your test results and keep a list of the medicines you take. Where can you learn more? Go to https://ClutchpeBlue Skies Networkseweb.NextPrinciples. org and sign in to your [employer] account. Enter Q317 in the [employer] box to learn more about \"Ankle: Exercises. \"     If you do not have an account, please click on the \"Sign Up Now\" link. Current as of: July 1, 2021               Content Version: 13.1  © 2006-2021 Healthwise, Incorporated. Care instructions adapted under license by [facility] ([facility]). If you have questions about a medical condition or this instruction, always ask your healthcare professional. [place] [address] any warranty or liability for your use of this information.

## 2022-03-01 NOTE — PROGRESS NOTES
HPI Notes    Name: Claudia Gilbert  : 1970         Chief Complaint:     Chief Complaint   Patient presents with    Ankle Pain     States she thought her  LT ankle pain was from her hip she jimmy to the [facility] and he advised her to make an appt becasue her hip was fine , Pt denies injury to it        History of Present Illness:      HPI    Is a 77-year-old woman presenting to discuss subacute left ankle pain. This is been ongoing for the past 2 months. She does not remember any specific injury, overuse or trauma occurring to this extremity or joint. She was concerned that it may have been referred discomfort from a \"hip problem\", and then sought evaluation from her chiropractor 4 days ago. Her chiropractor did not appreciate any dysfunction in her left hip and advised her to make this appointment with me today. There is no new swelling. The discomfort is constant dull aching pain localized over the anterosuperior aspect of the ankle joint and is not associated with bruising, and not associated with any certain motions but does worsen with prolonged standing, and use throughout the day. She has been using ibuprofen 400 mg TID PRN to modest effect at pain reduction. Past Medical History:     Past Medical History:   Diagnosis Date    Asthma     Glaucoma     Closed fior Glaucoma      Reviewed all health maintenance requirements and ordered appropriate tests  Health Maintenance Due   Topic Date Due    COVID-19 Vaccine (1) Never done    Colorectal Cancer Screen  Never done    Breast cancer screen  Never done    Shingles Vaccine (1 of 2) Never done    Flu vaccine (1) 2021       Past Surgical History:     Past Surgical History:   Procedure Laterality Date    CARPAL TUNNEL RELEASE      CHOLECYSTECTOMY      HYSTERECTOMY      SHOULDER SURGERY      x 3, left    TUBAL LIGATION          Medications:       Prior to Admission medications    Medication Sig Start Date End Date Taking? Authorizing Provider   Magnesium Cl-Calcium Carbonate (SLOW-MAG PO) Take by mouth Takes 1 tab in the am and 1 tab in the PM   Yes Historical Provider, MD   latanoprost (XALATAN) 0.005 % ophthalmic solution  2/7/22  Yes Historical Provider, MD   sodium chloride (OCEAN) 0.65 % nasal spray 1 spray by Nasal route as needed for Congestion 1/30/22  Yes Mabel Freeman MD   ibuprofen (IBU) 600 MG tablet Take 600 mg by mouth every 6 hours as needed   Yes Historical Provider, MD   fluticasone (FLONASE) 50 MCG/ACT nasal spray 2 sprays by Nasal route daily 4/1/19 1/5/22  Historical Provider, MD        Allergies:       Adhesive tape, Codeine, Demerol hcl [meperidine], Dexamethasone, Hydrocodone-acetaminophen, Oxycodone-acetaminophen, and Prednisone    Social History:     Tobacco:    reports that she has been smoking cigarettes. She has a 15.00 pack-year smoking history. She has never used smokeless tobacco.  Alcohol:      reports previous alcohol use. Drug Use:  reports no history of drug use. Family History:     Family History   Problem Relation Age of Onset    Thyroid Disease Mother     High Blood Pressure Mother     Cancer Mother     Cancer Father     Hypotension Sister     Lupus Sister     High Blood Pressure Brother     High Blood Pressure Brother        Review of Systems:         Review of Systems   Constitutional: Negative for fever. HENT: Negative for rhinorrhea, sinus pain and sneezing. Respiratory: Negative for cough and wheezing. Cardiovascular: Negative for chest pain. Gastrointestinal: Negative for abdominal pain, diarrhea, nausea and vomiting. Musculoskeletal: Positive for arthralgias and joint swelling. Negative for back pain. Skin: Negative for rash. Neurological: Negative for headaches. Psychiatric/Behavioral: Negative for sleep disturbance.        Physical Exam:     Vitals:  /84 (Site: Left Upper Arm, Position: Sitting, Cuff Size: Medium Adult)   Pulse 80   Resp 20   Ht 5' 2\" (1.575 m)   Wt 140 lb (63.5 kg)   BMI 25.61 kg/m²       Physical Exam  Vitals and nursing note reviewed. Constitutional:       General: She is not in acute distress. Appearance: Normal appearance. She is normal weight. Musculoskeletal:         General: Tenderness present. Right ankle: Normal.      Left ankle: No swelling, deformity or ecchymosis. No tenderness. Normal range of motion. Anterior drawer test positive. Normal pulse. Left Achilles Tendon: Normal.   Skin:     General: Skin is warm and dry. Capillary Refill: Capillary refill takes less than 2 seconds. Findings: No rash. Neurological:      General: No focal deficit present. Mental Status: She is alert and oriented to person, place, and time. Mental status is at baseline. Cranial Nerves: No cranial nerve deficit. Psychiatric:         Mood and Affect: Mood normal.         Behavior: Behavior normal.         Thought Content: Thought content normal.         Data:     Lab Results   Component Value Date     01/24/2018    K 3.8 01/24/2018     01/24/2018    CO2 25 01/24/2018    BUN 11 01/24/2018    CREATININE 0.66 01/24/2018    GLUCOSE 96 01/24/2018    PROT 7.7 04/29/2013    LABALBU 4.7 04/29/2013    BILITOT 0.29 04/29/2013    ALKPHOS 127 04/29/2013    AST 59 04/29/2013     04/29/2013     Lab Results   Component Value Date    WBC 7.8 01/24/2018    RBC 4.45 01/24/2018    HGB 13.3 01/24/2018    HCT 39.9 01/24/2018    MCV 89.6 01/24/2018    MCH 30.0 01/24/2018    MCHC 33.4 01/24/2018    RDW 14.5 01/24/2018     01/24/2018    MPV NOT REPORTED 01/24/2018     No results found for: TSH  Lab Results   Component Value Date    CHOL 204 02/12/2022    HDL 39 02/12/2022    LABA1C 5.7 02/10/2022         Assessment & Plan        Diagnosis Orders   1. Acute left ankle pain     2. Ligamentous laxity of ankle, left         1.   Based on history, physical examination, there is demonstrated ligamentous laxity of the left ankle during the anterior drawer test, anticipate that she may be putting undue stress on this joint with repeated motion, predisposing her to osteoarthritis. I am not concerned for occult fracture and would not recommend plain film x-ray due to lack of pain with palpation of the malleoli, fifth metatarsal head, and negative tap test for all the digits. I would recommend that she begin home rehabilitation exercises, be referred to physical therapy. She may continue her NSAID regimen. Follow up in 6 weeks as needed for this problem. Completed Refills   Requested Prescriptions      No prescriptions requested or ordered in this encounter     No follow-ups on file. No orders of the defined types were placed in this encounter. No orders of the defined types were placed in this encounter. Patient Instructions     SURVEY:    You may be receiving a survey from [email or web address] regarding your visit today. Please complete the survey to enable us to provide the highest quality of care to you and your family. If you cannot score us a very good on any question, please call the office to discuss how we could of made your experience a very good one. Thank you. Clinical Care Team:     Dr. Catracho Browne, BOB      ClericalTeam:     Ana Aaron      Electronically signed by Idalmis Pearson DO on 3/1/2022 at 10:48 AM           Completed Refills   Requested Prescriptions      No prescriptions requested or ordered in this encounter     Tenzin Cam received counseling on the following healthy behaviors: nutrition, exercise and medication adherence  Reviewed prior labs and health maintenance. Continue current medications, diet and exercise. Discussed use, benefit, and side effects of prescribed medications. Barriers to medication compliance addressed. Patient given educational materials - see patient instructions.     All patient questions answered. Patient voiced understanding.

## 2022-03-11 ENCOUNTER — HOSPITAL ENCOUNTER (OUTPATIENT)
Dept: GENERAL RADIOLOGY | Age: 52
Discharge: HOME | End: 2022-03-13
Payer: COMMERCIAL

## 2022-03-11 ENCOUNTER — HOSPITAL ENCOUNTER (OUTPATIENT)
Age: 52
Discharge: HOME | End: 2022-03-13
Payer: COMMERCIAL

## 2022-03-11 ENCOUNTER — OFFICE VISIT (OUTPATIENT)
Dept: FAMILY MEDICINE CLINIC | Age: 52
End: 2022-03-11
Payer: COMMERCIAL

## 2022-03-11 VITALS
DIASTOLIC BLOOD PRESSURE: 70 MMHG | WEIGHT: 139 LBS | BODY MASS INDEX: 25.58 KG/M2 | RESPIRATION RATE: 20 BRPM | SYSTOLIC BLOOD PRESSURE: 102 MMHG | HEIGHT: 62 IN | HEART RATE: 92 BPM | OXYGEN SATURATION: 97 %

## 2022-03-11 DIAGNOSIS — M79.645 PAIN OF FINGER OF LEFT HAND: ICD-10-CM

## 2022-03-11 DIAGNOSIS — M99.00 SOMATIC DYSFUNCTION OF HEAD REGION: ICD-10-CM

## 2022-03-11 DIAGNOSIS — J34.89 TENDERNESS OVER MAXILLARY SINUS: Primary | ICD-10-CM

## 2022-03-11 PROCEDURE — 73130 X-RAY EXAM OF HAND: CPT

## 2022-03-11 PROCEDURE — 98925 OSTEOPATH MANJ 1-2 REGIONS: CPT | Performed by: STUDENT IN AN ORGANIZED HEALTH CARE EDUCATION/TRAINING PROGRAM

## 2022-03-11 PROCEDURE — 99213 OFFICE O/P EST LOW 20 MIN: CPT | Performed by: STUDENT IN AN ORGANIZED HEALTH CARE EDUCATION/TRAINING PROGRAM

## 2022-03-11 ASSESSMENT — ENCOUNTER SYMPTOMS
ABDOMINAL PAIN: 0
DIARRHEA: 0
BACK PAIN: 0
SINUS PRESSURE: 1
NAUSEA: 0
SINUS PAIN: 0
VOMITING: 0
SINUS PRESSURE: 1
RHINORRHEA: 0
COUGH: 0
WHEEZING: 0

## 2022-03-11 NOTE — PROGRESS NOTES
Interval history: This is a very nice 58-year-old woman presenting for interval treatment with OMT for somatic dysfunction. She is interested in receiving treatment for her sinuses due to nasal congestion. I treated her to good effect with this therapy approximately 1 month ago. ***    /70 (Site: Left Upper Arm, Position: Sitting, Cuff Size: Medium Adult)   Pulse 92   Resp 20   Ht 5' 2\" (1.575 m)   Wt 139 lb (63 kg)   SpO2 97%   BMI 25.42 kg/m²     Procedure Note: After history taking and examination of patient, it was determined that a beneficial treatment modality for their complaint would be osteopathic manipulative therapy (OMT) the nature of OMT, treatment goals, mechanism of action, and side effects were extensively discussed with this patient, who did wish to proceed with the procedure. The following body systems were treated with osteopathy during our office visit today: ***. Osteopathic findings include ***. After treatment, the patient was reevaluated and the following physical exam findings were appreciated: ***. We determined that the next best time for the patient to return to the office for additional treatment would be ***. At this point, her treatment was concluded, there were no complications, there were no further questions. Patient tolerated this procedure very well.     Deirdre Pino DO  3/11/2022

## 2022-03-11 NOTE — PROGRESS NOTES
HPI Notes    Name: Rob Villagomez  : 1970         Chief Complaint:     Chief Complaint   Patient presents with    Sinus Problem     OMT    Finger Pain     left middle finger, thought she jammed it only feels better in a finger splint and keeping it up onset 2022       History of Present Illness:      HPI    Sinus discomfort: she reports this problem is improving. I treated her with sinus effleurage about a month ago. She is no longer having copious rhinorrhea, and sinus discomfort. She states her zygomae and orbits feel very relieved and free of pressure. Again, there is no tooth pain or bloody nasal discharge. She would also like to discuss pain of the left third digit of the hand. On 22 she jammed her extended finger into the floor while attempting to  an object at work. She immediately experienced marked discomfort over the middle interphalangeal joint and limitation in flexion d/t pain. She has been taking ibuprofen 600 mg q4 hr PRN for relief and has put a splint on the finger. Discomfort is not improving. Past Medical History:     Past Medical History:   Diagnosis Date    Asthma     Glaucoma     Closed fior Glaucoma      Reviewed all health maintenance requirements and ordered appropriate tests  Health Maintenance Due   Topic Date Due    COVID-19 Vaccine (1) Never done    Colorectal Cancer Screen  Never done    Breast cancer screen  Never done    Shingles Vaccine (1 of 2) Never done    Flu vaccine (1) 2021       Past Surgical History:     Past Surgical History:   Procedure Laterality Date    CARPAL TUNNEL RELEASE      CHOLECYSTECTOMY      HYSTERECTOMY      SHOULDER SURGERY      x 3, left    TUBAL LIGATION          Medications:       Prior to Admission medications    Medication Sig Start Date End Date Taking?  Authorizing Provider   Magnesium Cl-Calcium Carbonate (SLOW-MAG PO) Take by mouth Takes 1 tab in the am and 1 tab in the PM   Yes Historical Provider, MD   latanoprost (XALATAN) 0.005 % ophthalmic solution  2/7/22  Yes Historical Provider, MD   sodium chloride (OCEAN) 0.65 % nasal spray 1 spray by Nasal route as needed for Congestion 1/30/22  Yes Sahra Nation MD   ibuprofen (IBU) 600 MG tablet Take 600 mg by mouth every 6 hours as needed   Yes Historical Provider, MD   fluticasone [facility]) 50 MCG/ACT nasal spray 2 sprays by Nasal route daily 4/1/19 1/5/22  Historical Provider, MD        Allergies:       Adhesive tape, Codeine, Demerol hcl [meperidine], Dexamethasone, Hydrocodone-acetaminophen, Oxycodone-acetaminophen, and Prednisone    Social History:     Tobacco:    reports that she has been smoking cigarettes. She has a 15.00 pack-year smoking history. She has never used smokeless tobacco.  Alcohol:      reports previous alcohol use. Drug Use:  reports no history of drug use. Family History:     Family History   Problem Relation Age of Onset    Thyroid Disease Mother     High Blood Pressure Mother     Cancer Mother     Cancer Father     Hypotension Sister     Lupus Sister     High Blood Pressure Brother     High Blood Pressure Brother        Review of Systems:     Review of Systems   Constitutional: Negative for fever. HENT: Positive for sinus pressure. Negative for congestion, rhinorrhea, sinus pain and sneezing. Respiratory: Negative for cough and wheezing. Cardiovascular: Negative for chest pain. Gastrointestinal: Negative for abdominal pain, diarrhea, nausea and vomiting. Musculoskeletal: Positive for arthralgias. Negative for back pain. Left finger pain as per HPI   Skin: Negative for rash. Neurological: Negative for headaches. Psychiatric/Behavioral: Negative for sleep disturbance.        Physical Exam:     Vitals:  /70 (Site: Left Upper Arm, Position: Sitting, Cuff Size: Medium Adult)   Pulse 92   Resp 20   Ht 5' 2\" (1.575 m)   Wt 139 lb (63 kg)   SpO2 97%   BMI 25.42 kg/m²       Physical Exam  Vitals and nursing note reviewed. Constitutional:       General: She is not in acute distress. Appearance: Normal appearance. She is normal weight. Musculoskeletal:         General: Tenderness present. Comments: Positive tap test for left third digit of hand. Limited aROM in flexion d/t pain. Preserved aROM in extension. Skin:     General: Skin is warm and dry. Capillary Refill: Capillary refill takes less than 2 seconds. Neurological:      General: No focal deficit present. Mental Status: She is alert and oriented to person, place, and time. Mental status is at baseline. Cranial Nerves: No cranial nerve deficit. Psychiatric:         Mood and Affect: Mood normal.         Behavior: Behavior normal.         Thought Content: Thought content normal.       Osteopathic: fullness and mild tenderness to palpation of right maxillary sinus      Data:     Lab Results   Component Value Date     01/24/2018    K 3.8 01/24/2018     01/24/2018    CO2 25 01/24/2018    BUN 11 01/24/2018    CREATININE 0.66 01/24/2018    GLUCOSE 96 01/24/2018    PROT 7.7 04/29/2013    LABALBU 4.7 04/29/2013    BILITOT 0.29 04/29/2013    ALKPHOS 127 04/29/2013    AST 59 04/29/2013     04/29/2013     Lab Results   Component Value Date    WBC 7.8 01/24/2018    RBC 4.45 01/24/2018    HGB 13.3 01/24/2018    HCT 39.9 01/24/2018    MCV 89.6 01/24/2018    MCH 30.0 01/24/2018    MCHC 33.4 01/24/2018    RDW 14.5 01/24/2018     01/24/2018    MPV NOT REPORTED 01/24/2018     No results found for: TSH  Lab Results   Component Value Date    CHOL 204 02/12/2022    HDL 39 02/12/2022    LABA1C 5.7 02/10/2022          Assessment & Plan        Diagnosis Orders   1. Pain of finger of left hand  XR HAND LEFT (MIN 3 VIEWS)       1.  After history taking and examination of patient, it was determined that a beneficial treatment modality for their complaint would be osteopathic manipulative therapy (OMT) the nature of OMT, treatment goals, mechanism of action, and side effects were extensively discussed with this patient, who did wish to proceed with the procedure. The following body systems were treated with osteopathy during our office visit today: head. Osteopathic findings include fullness and mild tenderness to palpation of right maxillary sinus. After treatment, the patient was reevaluated and the following physical exam findings were appreciated: resolution of maxillary sinus tenderness/fullness. We determined that the next best time for the patient to return to the office for additional treatment would be as needed for this problem. At this point, her treatment was concluded, there were no complications, there were no further questions. Patient tolerated this procedure very well. 3. My concern is principally for a finger fracture due to positive tap test. I would recommend plain film XR today and referral to orthopaedics if necessary. DDx includes tendon injury. May continue splinting and NSAIDs. Follow up as needed      Completed Refills   Requested Prescriptions      No prescriptions requested or ordered in this encounter     No follow-ups on file. No orders of the defined types were placed in this encounter. Orders Placed This Encounter   Procedures    XR HAND LEFT (MIN 3 VIEWS)     Standing Status:   Future     Standing Expiration Date:   3/11/2023     Order Specific Question:   Reason for exam:     Answer:   concern for fracture         Patient Instructions     SURVEY:    You may be receiving a survey from [employer] regarding your visit today. Please complete the survey to enable us to provide the highest quality of care to you and your family. If you cannot score us a very good on any question, please call the office to discuss how we could of made your experience a very good one. Thank you.       Clinical Care Team:     Dr. Frank Allen, LAWANDA      ClericalTeam:     Lauren Alvarado Joan Fish Law      Electronically signed by Neris Oswald DO on 3/11/2022 at 4:21 PM           Completed Refills   Requested Prescriptions      No prescriptions requested or ordered in this encounter         Moses Becerril received counseling on the following healthy behaviors: nutrition, exercise and medication adherence  Reviewed prior labs and health maintenance. Continue current medications, diet and exercise. Discussed use, benefit, and side effects of prescribed medications. Barriers to medication compliance addressed. Patient given educational materials - see patient instructions. All patient questions answered. Patient voiced understanding.

## 2022-03-15 ENCOUNTER — HOSPITAL ENCOUNTER (OUTPATIENT)
Dept: PHYSICAL THERAPY | Age: 52
Setting detail: THERAPIES SERIES
Discharge: HOME | End: 2022-03-15
Payer: COMMERCIAL

## 2022-03-15 PROCEDURE — 97162 PT EVAL MOD COMPLEX 30 MIN: CPT

## 2022-03-15 ASSESSMENT — PAIN SCALES - GENERAL: PAINLEVEL_OUTOF10: 3

## 2022-03-15 NOTE — PROGRESS NOTES
Phone: [phone]         Fax: [phone]                      Outpatient Physical Therapy                                                                      Evaluation    Date: 3/15/2022  Patient: Claudia Gilbert  : 1970  ACCT #: [de-identified]    Referring Practitioner: Dr. Adalberto Villalta    Referral Date : 22    Diagnosis: Left ankle pain; laxity    Treatment Diagnosis: Left ankle pain  Onset Date: 22  PT Insurance Information: [address]    Per Physician Order  Total # of Visits to Date: 1  No Show: 0  Canceled Appointment: 0     Subjective     Additional Pertinent Hx: 3 month onset of progressive left ankle pain without etiology. Increased pain with activity however also notes pain at rest/night. No diagnostic testing. Continues to work;  notes progressive limping with activity. No follow up with MD.   LEFS = 48/80.   PMHx includes left shoulder surgery, asthma  Pain Screening  Patient Currently in Pain: Yes  Pain Assessment  Pain Assessment: 0-10  Pain Level: 3 (may increase to 8/10 )     IADL History  Active : Yes  Occupation: Full time employment  Type of occupation: [employer] - standing and walking; minimal lifting  Leisure & Hobbies: Active with daily household tasks    Objective  Vision  Vision: Within Functional Limits  Hearing  Hearing: Within functional limits  Observation/Palpation  Posture: Fair  Palpation: Mild tenderness lateral malleolus     Strength LLE  Strength LLE: Exception  L Hip ABduction: 3/5  L Ankle Dorsiflexion: 4/5  L Ankle Plantar Flexion: 4/5  L Ankle Inversion: 4/5  L Ankle Eversion: 4/5  AROM LLE (degrees)  LLE AROM : WNL  LLE General AROM: DF 15 deg; PF 55 deg       AROM LLE (degrees)  LLE AROM : WNL  LLE General AROM: DF 15 deg; PF 55 deg        PROM LLE (degrees)  LLE PROM: WNL  LLE General PROM: Mild discomfort with left hip IR                               Assessment  Assessment: Patient presents with left ankle pain related to instability. She also presents with moderate hip weakness which also contributes to ankle instability with walking/standing. Patient would benefit from short term PT to educate on home program of ankle and hip strengthening/stabilization ex  Prognosis: Good  Decision Making: Medium Complexity  Exam: UEFS = 48/80    Clinical Presentation:  Evolving  The Following Comorbities will impact the patients progression and Plan of Care:   Previous Orthopedic Injury/Surgery       Activity Tolerance: Patient Tolerated treatment well    Education: PT POC;   Educated on left ankle and hip stabilization/strengthening ex.   Access Code [email or web address]          Goals  Short term goals  Time Frame for Short term goals: 3 visits  Short term goal 1: Educate on home program for left ankle and hip stabilization ex     Long term goals  Time Frame for Long term goals : 9 visits  Long term goal 1: Decrease subjective left ankle pain with standing/walking by > 50% or less than 4/10 with work activities    Patient's Goal:    Improve left ankle strength to decrease pain and instability    Timed Code Treatment Minutes: 0 Minutes  Total Treatment Time: 50     Time In: 0399  Time Out: 9472    SAILGXF SALIMA, PT Date: 3/15/2022

## 2022-03-15 NOTE — PLAN OF CARE
[facility] YANIRA ALCAZAR       Phone: [phone]   Date: 3/15/2022                      Outpatient Physical Therapy  Fax: [phone]    ACCT #: [de-identified]                     Plan of Care  [facility]#: [id number]  Patient: Ramana Diaz  : 1970    Referring Practitioner: Dr. Arleen Trejo    Referral Date : 22    Diagnosis: Left ankle pain; laxity  Onset Date: 22  Treatment Diagnosis: Left ankle pain    Assessment: Patient presents with left ankle pain related to instability. She also presents with moderate hip weakness which also contributes to ankle instability with walking/standing. Patient would benefit from short term PT to educate on home program of ankle and hip strengthening/stabilization ex  Prognosis: Good    Treatment Plan :   Days: 1 times per week Weeks: 8 weeks      Patient Education/HEP and Therapeutic Exercise     Goals  Time Frame for Short term goals: 3 visits  Short term goal 1: Educate on home program for left ankle and hip stabilization ex     Time Frame for Long term goals : 9 visits  Long term goal 1: Decrease subjective left ankle pain with standing/walking by > 50% or less than 4/10 with work activities     Invup, PT   Date: 3/15/2022    ______________________________________ Date: 3/15/2022  Physician Signature  By signing above or cosigning electronically, I have reviewed this Plan of Care and certify a need for medically necessary rehabilitation services.

## 2022-03-22 ENCOUNTER — APPOINTMENT (OUTPATIENT)
Dept: PHYSICAL THERAPY | Age: 52
End: 2022-03-22
Payer: COMMERCIAL

## 2022-05-13 ENCOUNTER — PATIENT MESSAGE (OUTPATIENT)
Dept: FAMILY MEDICINE CLINIC | Age: 52
End: 2022-05-13

## 2022-05-13 NOTE — TELEPHONE ENCOUNTER
From: Jolynn Valle  To: Dr. Abraham Vasquez  Sent: 5/13/2022 6:26 AM EDT  Subject: New patient my mother    Can you make appointment for my mom? She would be a new patient. Please let me know if you are still talking new patients.  Thank you

## 2022-05-25 ENCOUNTER — PATIENT MESSAGE (OUTPATIENT)
Dept: FAMILY MEDICINE CLINIC | Age: 52
End: 2022-05-25

## 2022-05-25 NOTE — TELEPHONE ENCOUNTER
From: Farida Braswell  To: Dr. Edna Paul  Sent: 5/25/2022 11:19 AM EDT  Subject: Need an appointment    I need appointment as soon as possible. My leg is swollen for 2 days now. And this so called gerd had been bad all month and nothing helping. Then I took an aspirin and it was gone in 20-30 minutes.  Right after that is when my leg swelled up

## 2022-05-26 ENCOUNTER — OFFICE VISIT (OUTPATIENT)
Dept: FAMILY MEDICINE CLINIC | Age: 52
End: 2022-05-26
Payer: COMMERCIAL

## 2022-05-26 ENCOUNTER — HOSPITAL ENCOUNTER (OUTPATIENT)
Age: 52
Discharge: HOME | End: 2022-05-26
Payer: COMMERCIAL

## 2022-05-26 ENCOUNTER — PATIENT MESSAGE (OUTPATIENT)
Dept: FAMILY MEDICINE CLINIC | Age: 52
End: 2022-05-26

## 2022-05-26 VITALS
SYSTOLIC BLOOD PRESSURE: 138 MMHG | DIASTOLIC BLOOD PRESSURE: 80 MMHG | HEART RATE: 82 BPM | TEMPERATURE: 98.4 F | OXYGEN SATURATION: 98 %

## 2022-05-26 DIAGNOSIS — I20.9 ANGINA PECTORIS (HCC): Primary | ICD-10-CM

## 2022-05-26 DIAGNOSIS — I20.9 ANGINA PECTORIS (HCC): ICD-10-CM

## 2022-05-26 DIAGNOSIS — M79.604 RIGHT LEG PAIN: ICD-10-CM

## 2022-05-26 DIAGNOSIS — Z86.19 HISTORY OF HELICOBACTER PYLORI INFECTION: ICD-10-CM

## 2022-05-26 DIAGNOSIS — K21.9 GASTROESOPHAGEAL REFLUX DISEASE WITHOUT ESOPHAGITIS: ICD-10-CM

## 2022-05-26 PROCEDURE — 93005 ELECTROCARDIOGRAM TRACING: CPT

## 2022-05-26 PROCEDURE — 99214 OFFICE O/P EST MOD 30 MIN: CPT | Performed by: NURSE PRACTITIONER

## 2022-05-26 RX ORDER — PANTOPRAZOLE SODIUM 40 MG/1
40 TABLET, DELAYED RELEASE ORAL
Qty: 30 TABLET | Refills: 2 | Status: SHIPPED | OUTPATIENT
Start: 2022-05-26 | End: 2022-08-18 | Stop reason: ALTCHOICE

## 2022-05-26 RX ORDER — OMEPRAZOLE 40 MG/1
40 CAPSULE, DELAYED RELEASE ORAL
Qty: 30 CAPSULE | Refills: 0 | Status: SHIPPED | OUTPATIENT
Start: 2022-05-26 | End: 2022-08-18 | Stop reason: ALTCHOICE

## 2022-05-26 ASSESSMENT — ENCOUNTER SYMPTOMS
DIARRHEA: 0
COUGH: 0
VOMITING: 0
NAUSEA: 0
SHORTNESS OF BREATH: 1

## 2022-05-26 NOTE — PROGRESS NOTES
HPI Notes    Name: Roetta Dakin  : 1970         Chief Complaint:     Chief Complaint   Patient presents with    Chest Pain     Patient here today with episodes of chest pain, off and on for years. She took asa and felt better.  Leg Swelling     Patient complains of right lower leg pain , ankle pain. Some swelling, better now. History of Present Illness:        Chest Pain   This is a chronic problem. The current episode started more than 1 year ago (around age 55). The onset quality is gradual. The problem occurs 2 to 4 times per day. The pain is present in the substernal region. The pain is moderate. The quality of the pain is described as crushing, dull and heavy. The pain radiates to the left jaw. Associated symptoms include diaphoresis and shortness of breath. Pertinent negatives include no cough, dizziness, fever, headaches, nausea, palpitations or vomiting. Risk factors include lack of exercise, post-menopausal and smoking/tobacco exposure. Her past medical history is significant for hypertension. Pertinent negatives for past medical history include no anxiety/panic attacks, no MI and no seizures. Pt had GI work up in  with Dr Barbie Lyman. Relates that she had H pylori that she \"can't ever get rid of\". Had an EGD 3 times and each time, despite being treated with antibiotics, was still present. States she completed full rounds of treatment twice, but was unable to complete the third round due to the medications making her sick. States that with this chest pain she took Nexium 40mg in AM and 40mg in PM x 2 weeks. Symptoms did not change. Pt states that she started taking ASA 325mg daily on 22 and her symptoms have been relieved. Complains of right leg pain that started after taking the ASA on 22. She noticed that her leg was swollen. Pain was only at night in the beginning but now it is day and night. Denies hx of anxiety. Mentions family hx of aneurysms.    Past Medical History:     Past Medical History:   Diagnosis Date    Asthma     Glaucoma     Closed fior Glaucoma      Reviewed all health maintenance requirements and ordered appropriate tests  Health Maintenance Due   Topic Date Due    COVID-19 Vaccine (1) Never done    Pneumococcal 0-64 years Vaccine (2 - PCV) 12/06/2011    Colorectal Cancer Screen  Never done    Breast cancer screen  Never done    Shingles vaccine (1 of 2) Never done       Past Surgical History:     Past Surgical History:   Procedure Laterality Date    CARPAL TUNNEL RELEASE      CHOLECYSTECTOMY      HYSTERECTOMY      SHOULDER SURGERY      x 3, left    TUBAL LIGATION          Medications:       Prior to Admission medications    Medication Sig Start Date End Date Taking? Authorizing Provider   omeprazole (PRILOSEC) 40 MG delayed release capsule Take 1 capsule by mouth every morning (before breakfast) 5/26/22  Yes EDER Niño CNP   Magnesium Cl-Calcium Carbonate (SLOW-MAG PO) Take by mouth Takes 1 tab in the am and 1 tab in the PM   Yes Historical Provider, MD   latanoprost (XALATAN) 0.005 % ophthalmic solution  2/7/22  Yes Historical Provider, MD   sodium chloride (OCEAN) 0.65 % nasal spray 1 spray by Nasal route as needed for Congestion 1/30/22  Yes Beth Johnston MD   ibuprofen (IBU) 600 MG tablet Take 600 mg by mouth every 6 hours as needed   Yes Historical Provider, MD   fluticasone (FLONASE) 50 MCG/ACT nasal spray 2 sprays by Nasal route daily 4/1/19 1/5/22  Historical Provider, MD        Allergies:       Adhesive tape, Codeine, Demerol hcl [meperidine], Dexamethasone, Hydrocodone-acetaminophen, Oxycodone-acetaminophen, and Prednisone    Social History:     Tobacco:    reports that she has been smoking cigarettes. She has a 15.00 pack-year smoking history. She has never used smokeless tobacco.  Alcohol:      reports previous alcohol use. Drug Use:  reports no history of drug use.     Family History:     Family History Problem Relation Age of Onset    Thyroid Disease Mother     High Blood Pressure Mother     Cancer Mother     Cancer Father     Hypotension Sister     Lupus Sister     High Blood Pressure Brother     High Blood Pressure Brother        Review of Systems:         Review of Systems   Constitutional: Positive for diaphoresis. Negative for chills and fever. Respiratory: Positive for shortness of breath. Negative for cough. Cardiovascular: Positive for chest pain. Negative for palpitations and leg swelling. Gastrointestinal: Negative for diarrhea, nausea and vomiting. Neurological: Negative for dizziness, seizures and headaches. Physical Exam:     Vitals:  /80 (Site: Left Upper Arm)   Pulse 82   Temp 98.4 °F (36.9 °C) (Oral)   SpO2 98%       Physical Exam  Vitals and nursing note reviewed. Constitutional:       Appearance: Normal appearance. She is well-developed. Cardiovascular:      Rate and Rhythm: Normal rate and regular rhythm. Heart sounds: Normal heart sounds, S1 normal and S2 normal.   Pulmonary:      Effort: Pulmonary effort is normal. No respiratory distress. Breath sounds: Normal breath sounds. Abdominal:      General: Bowel sounds are normal.      Palpations: Abdomen is soft. Tenderness: There is no abdominal tenderness. Skin:     General: Skin is warm and dry. Neurological:      Mental Status: She is alert and oriented to person, place, and time.                Data:     Lab Results   Component Value Date     01/24/2018    K 3.8 01/24/2018     01/24/2018    CO2 25 01/24/2018    BUN 11 01/24/2018    CREATININE 0.66 01/24/2018    GLUCOSE 96 01/24/2018    PROT 7.7 04/29/2013    LABALBU 4.7 04/29/2013    BILITOT 0.29 04/29/2013    ALKPHOS 127 04/29/2013    AST 59 04/29/2013     04/29/2013     Lab Results   Component Value Date    WBC 7.8 01/24/2018    RBC 4.45 01/24/2018    HGB 13.3 01/24/2018    HCT 39.9 01/24/2018    MCV 89.6 01/24/2018    MCH 30.0 01/24/2018    MCHC 33.4 01/24/2018    RDW 14.5 01/24/2018     01/24/2018    MPV NOT REPORTED 01/24/2018     No results found for: TSH  Lab Results   Component Value Date    CHOL 204 02/12/2022    HDL 39 02/12/2022    LABA1C 5.7 02/10/2022          Assessment & Plan        Diagnosis Orders   1. Angina pectoris ([address]  --pt presenting with episodes of chest pain, diaphoresis, and pain that radiates to the left neck. Will send for EKG. Will send to Cardiology. EKG 12 Lead   2. Gastroesophageal reflux disease without esophagitis   --pt educated about PPI. omeprazole (PRILOSEC) 40 MG delayed release capsule    H. pylori antigen   3. History of Helicobacter pylori infection   --reported hx of H pylori that was \"unable to be fixed\". Will order H pylori stool test.  H. pylori antigen   4. Right leg pain   --no evidence of problem or swelling with right leg. Continue to monitor. Patient verbalizes understanding and agreement with plan. All questions answered. If symptoms do not resolve or worsen, return to office. Completed Refills   Requested Prescriptions     Signed Prescriptions Disp Refills    omeprazole (PRILOSEC) 40 MG delayed release capsule 30 capsule 0     Sig: Take 1 capsule by mouth every morning (before breakfast)     No follow-ups on file. Orders Placed This Encounter   Medications    omeprazole (PRILOSEC) 40 MG delayed release capsule     Sig: Take 1 capsule by mouth every morning (before breakfast)     Dispense:  30 capsule     Refill:  0     Orders Placed This Encounter   Procedures    H. pylori antigen     Standing Status:   Future     Standing Expiration Date:   5/26/2023    EKG 12 Lead     Standing Status:   Future     Number of Occurrences:   1     Standing Expiration Date:   5/26/2023     Order Specific Question:   Reason for Exam?     Answer:   Chest pain         There are no Patient Instructions on file for this visit.     Electronically signed by EDER Phoenix CNP on 5/26/2022 at 3:06 PM           Completed Refills   Requested Prescriptions     Signed Prescriptions Disp Refills    omeprazole (PRILOSEC) 40 MG delayed release capsule 30 capsule 0     Sig: Take 1 capsule by mouth every morning (before breakfast)

## 2022-05-26 NOTE — TELEPHONE ENCOUNTER
From: Jing Ro  To: Remigio Medico  Sent: 5/26/2022 4:37 PM EDT  Subject: Prilosec    I can't take this. It makes me sick to my stomach. I've tried it before. It's there anything else you can prescribe?

## 2022-05-27 LAB
EKG ATRIAL RATE: 71 BPM
EKG P AXIS: 42 DEGREES
EKG P-R INTERVAL: 172 MS
EKG Q-T INTERVAL: 382 MS
EKG QRS DURATION: 84 MS
EKG QTC CALCULATION (BAZETT): 415 MS
EKG R AXIS: 72 DEGREES
EKG T AXIS: 72 DEGREES
EKG VENTRICULAR RATE: 71 BPM

## 2022-05-27 PROCEDURE — 93010 ELECTROCARDIOGRAM REPORT: CPT | Performed by: INTERNAL MEDICINE

## 2022-06-10 ENCOUNTER — OFFICE VISIT (OUTPATIENT)
Dept: FAMILY MEDICINE CLINIC | Age: 52
End: 2022-06-10
Payer: COMMERCIAL

## 2022-06-10 VITALS
WEIGHT: 136.2 LBS | OXYGEN SATURATION: 98 % | BODY MASS INDEX: 25.06 KG/M2 | HEIGHT: 62 IN | RESPIRATION RATE: 20 BRPM | HEART RATE: 82 BPM | SYSTOLIC BLOOD PRESSURE: 134 MMHG | DIASTOLIC BLOOD PRESSURE: 80 MMHG

## 2022-06-10 DIAGNOSIS — M79.604 RIGHT LEG PAIN: Primary | ICD-10-CM

## 2022-06-10 DIAGNOSIS — I20.9 ANGINA PECTORIS (HCC): ICD-10-CM

## 2022-06-10 PROCEDURE — 99214 OFFICE O/P EST MOD 30 MIN: CPT | Performed by: STUDENT IN AN ORGANIZED HEALTH CARE EDUCATION/TRAINING PROGRAM

## 2022-06-10 ASSESSMENT — PATIENT HEALTH QUESTIONNAIRE - PHQ9
SUM OF ALL RESPONSES TO PHQ QUESTIONS 1-9: 0
7. TROUBLE CONCENTRATING ON THINGS, SUCH AS READING THE NEWSPAPER OR WATCHING TELEVISION: 0
SUM OF ALL RESPONSES TO PHQ QUESTIONS 1-9: 0
10. IF YOU CHECKED OFF ANY PROBLEMS, HOW DIFFICULT HAVE THESE PROBLEMS MADE IT FOR YOU TO DO YOUR WORK, TAKE CARE OF THINGS AT HOME, OR GET ALONG WITH OTHER PEOPLE: 0
3. TROUBLE FALLING OR STAYING ASLEEP: 0
2. FEELING DOWN, DEPRESSED OR HOPELESS: 0
9. THOUGHTS THAT YOU WOULD BE BETTER OFF DEAD, OR OF HURTING YOURSELF: 0
SUM OF ALL RESPONSES TO PHQ9 QUESTIONS 1 & 2: 0
1. LITTLE INTEREST OR PLEASURE IN DOING THINGS: 0
8. MOVING OR SPEAKING SO SLOWLY THAT OTHER PEOPLE COULD HAVE NOTICED. OR THE OPPOSITE, BEING SO FIGETY OR RESTLESS THAT YOU HAVE BEEN MOVING AROUND A LOT MORE THAN USUAL: 0
6. FEELING BAD ABOUT YOURSELF - OR THAT YOU ARE A FAILURE OR HAVE LET YOURSELF OR YOUR FAMILY DOWN: 0
4. FEELING TIRED OR HAVING LITTLE ENERGY: 0
5. POOR APPETITE OR OVEREATING: 0
SUM OF ALL RESPONSES TO PHQ QUESTIONS 1-9: 0
SUM OF ALL RESPONSES TO PHQ QUESTIONS 1-9: 0

## 2022-06-10 NOTE — PROGRESS NOTES
HPI Notes    Name: Chari Claudio  : 1970         Chief Complaint:     Chief Complaint   Patient presents with    Leg Pain     States leg pain up to her knee startes in her achilles tendon on top of her foot is blue and goes away when she showers     Chest Pain     was started on nexium in may, takes pepto, and tums states they only thing she changed was she stopped taking aspirin and he stomach issue resolved       History of Present Illness:      HPI    This is a 70-year-old woman with medical history significant for nonrheumatic mitral valve regurgitation, known closed-angle glaucoma presenting for evaluation of left lower extremity discomfort, after discussion with Mr. Brandon Hart for the same problem. She also wishes to discuss her chest discomfort. She had seen Mr. Brandon Hart for this problem on 2022. I did have time to review that note. I reviewed similar things that she had shared with me one of the first times we met. She reports that she had a GI work-up about 10 years ago, and was found to have \"H. pylori that can never be resolved\". Repeat H. pylori stool antigen has not been completed yet. She reiterates her report from 22:   \"States that with this chest pain she took Nexium 40mg in AM and 40mg in PM x 2 weeks. Symptoms did not change. Pt states that she started taking ASA 325mg daily on 22  and her symptoms have been relieved. Complains of right leg pain that started after taking the ASA on 22. She noticed that her  leg was swollen. Pain was only at night in the beginning but now it is day and night\"    She was indicating that she developed throbbing discomfort over the right Achilles tendon, radiating proximally to the calf, and distally along the lateral edge of her foot into the toes. She had dusky discoloration over the dorsum of her foot which resolves with heat. The following day, she noted swelling of the RLE, particularly the knee.  The following day she went to her eye specialist who noted that she had \"really elevated pressures in my eyes which could be a sign of restricted blood flow somewhere and that I should talk with my doctor\". Past Medical History:     Past Medical History:   Diagnosis Date    Asthma     Glaucoma     Closed fior Glaucoma      Reviewed all health maintenance requirements and ordered appropriate tests  Health Maintenance Due   Topic Date Due    COVID-19 Vaccine (1) Never done    Pneumococcal 0-64 years Vaccine (2 - PCV) 12/06/2011    Colorectal Cancer Screen  Never done    Breast cancer screen  Never done    Shingles vaccine (1 of 2) Never done       Past Surgical History:     Past Surgical History:   Procedure Laterality Date    CARPAL TUNNEL RELEASE      CHOLECYSTECTOMY      HYSTERECTOMY (CERVIX STATUS UNKNOWN)      SHOULDER SURGERY      x 3, left    TUBAL LIGATION          Medications:       Prior to Admission medications    Medication Sig Start Date End Date Taking?  Authorizing Provider   omeprazole (PRILOSEC) 40 MG delayed release capsule Take 1 capsule by mouth every morning (before breakfast) 5/26/22  Yes EDER Motta CNP   pantoprazole (PROTONIX) 40 MG tablet Take 1 tablet by mouth every morning (before breakfast) 5/26/22  Yes EDER Motta CNP   Magnesium Cl-Calcium Carbonate (SLOW-MAG PO) Take by mouth Takes 1 tab in the am and 1 tab in the PM   Yes Historical Provider, MD   latanoprost (XALATAN) 0.005 % ophthalmic solution  2/7/22  Yes Historical Provider, MD   sodium chloride (OCEAN) 0.65 % nasal spray 1 spray by Nasal route as needed for Congestion 1/30/22  Yes Vahid Whitehead MD   ibuprofen (IBU) 600 MG tablet Take 600 mg by mouth every 6 hours as needed   Yes Historical Provider, MD   fluticasone (FLONASE) 50 MCG/ACT nasal spray 2 sprays by Nasal route daily 4/1/19 1/5/22  Historical Provider, MD        Allergies:       Adhesive tape, Codeine, Demerol hcl [meperidine], Dexamethasone, Hydrocodone-acetaminophen, Oxycodone-acetaminophen, and Prednisone    Social History:     Tobacco:    reports that she has been smoking cigarettes. She has a 15.00 pack-year smoking history. She has never used smokeless tobacco.  Alcohol:      reports previous alcohol use. Drug Use:  reports no history of drug use. Family History:     Family History   Problem Relation Age of Onset    Thyroid Disease Mother     High Blood Pressure Mother     Cancer Mother     Cancer Father     Hypotension Sister     Lupus Sister     High Blood Pressure Brother     High Blood Pressure Brother        Review of Systems:         Review of Systems   Constitutional: Negative for fever. HENT: Negative for rhinorrhea, sinus pain and sneezing. Respiratory: Negative for cough and wheezing. Cardiovascular: Positive for chest pain. Gastrointestinal: Negative for abdominal pain, diarrhea, nausea and vomiting. Musculoskeletal: Negative for back pain. Leg pain as per HPI   Skin: Negative for rash. Neurological: Negative for headaches. Psychiatric/Behavioral: Negative for sleep disturbance. Physical Exam:     Vitals:  /80 (Site: Right Upper Arm, Position: Sitting, Cuff Size: Medium Adult)   Pulse 82   Resp 20   Ht 5' 2\" (1.575 m)   Wt 136 lb 3.2 oz (61.8 kg)   SpO2 98%   BMI 24.91 kg/m²       Physical Exam  Vitals and nursing note reviewed. Constitutional:       General: She is not in acute distress. Appearance: Normal appearance. She is normal weight. Cardiovascular:      Comments: Brisk capillary refill and intact dorsalis pedis pulses for both lower extremities  Musculoskeletal:         General: Tenderness present. No swelling. Comments: Tender to palpation of the insertion of the Achilles tendon   Skin:     General: Skin is warm and dry. Capillary Refill: Capillary refill takes less than 2 seconds. Findings: No erythema or rash.    Neurological: General: No focal deficit present. Mental Status: She is alert and oriented to person, place, and time. Mental status is at baseline. Cranial Nerves: No cranial nerve deficit. Psychiatric:         Mood and Affect: Mood normal.         Behavior: Behavior normal.         Thought Content: Thought content normal.                 Data:     Lab Results   Component Value Date     01/24/2018    K 3.8 01/24/2018     01/24/2018    CO2 25 01/24/2018    BUN 11 01/24/2018    CREATININE 0.66 01/24/2018    GLUCOSE 96 01/24/2018    PROT 7.7 04/29/2013    LABALBU 4.7 04/29/2013    BILITOT 0.29 04/29/2013    ALKPHOS 127 04/29/2013    AST 59 04/29/2013     04/29/2013     Lab Results   Component Value Date    WBC 7.8 01/24/2018    RBC 4.45 01/24/2018    HGB 13.3 01/24/2018    HCT 39.9 01/24/2018    MCV 89.6 01/24/2018    MCH 30.0 01/24/2018    MCHC 33.4 01/24/2018    RDW 14.5 01/24/2018     01/24/2018    MPV NOT REPORTED 01/24/2018     No results found for: TSH  Lab Results   Component Value Date    CHOL 204 02/12/2022    HDL 39 02/12/2022    LABA1C 5.7 02/10/2022          Assessment & Plan        Diagnosis Orders   1. Right leg pain     2. Angina pectoris [facility])  Adama Chavez MD, Cardiology, [place] Deter       1, 2. I am not concerned for compartment syndrome or limb ischemia. She has a reassuring cardiovascular and peripheral vascular examination in the office today. I would recommend that she follow-up with cardiology for a cardiac stress test since her underlying chest discomfort could be concerning for an anginal equivalent. Follow-up in 6 months for interval well visit        Completed Refills   Requested Prescriptions      No prescriptions requested or ordered in this encounter     Return in about 6 months (around 12/10/2022) for Well Visit. No orders of the defined types were placed in this encounter.     Orders Placed This Encounter   Procedures   [address] [place] [address] MD, CardiologyKayleigh June     Referral Priority:   Routine     Referral Type:   Eval and Treat     Referral Reason:   Specialty Services Required     Referred to Provider:   Ken Oswald MD     Requested Specialty:   Cardiology     Number of Visits Requested:   1         Patient Instructions     SURVEY:    You may be receiving a survey from [employer] regarding your visit today. Please complete the survey to enable us to provide the highest quality of care to you and your family. If you cannot score us a very good on any question, please call the office to discuss how we could of made your experience a very good one. Thank you.       Clinical Care Team:     Dr. Ray Egan BOB      ClericalTeam:     Teresa Oneal      Electronically signed by Timbo Leyva DO on 6/22/2022 at 12:48 PM           Completed Refills   Requested Prescriptions      No prescriptions requested or ordered in this encounter

## 2022-06-10 NOTE — PATIENT INSTRUCTIONS
SURVEY:    You may be receiving a survey from [employer] regarding your visit today. Please complete the survey to enable us to provide the highest quality of care to you and your family. If you cannot score us a very good on any question, please call the office to discuss how we could of made your experience a very good one. Thank you.       Clinical Care Team:     Dr. Colleen Aguilar, BOB      [facility]     [id number] [facility]

## 2022-06-15 DIAGNOSIS — I20.9 ANGINA PECTORIS (HCC): Primary | ICD-10-CM

## 2022-06-22 ASSESSMENT — ENCOUNTER SYMPTOMS
WHEEZING: 0
BACK PAIN: 0
COUGH: 0
DIARRHEA: 0
VOMITING: 0
ABDOMINAL PAIN: 0
RHINORRHEA: 0
SINUS PAIN: 0
NAUSEA: 0

## 2022-06-23 ENCOUNTER — HOSPITAL ENCOUNTER (OUTPATIENT)
Dept: NON INVASIVE DIAGNOSTICS | Age: 52
Discharge: HOME | End: 2022-06-23
Payer: COMMERCIAL

## 2022-06-23 ENCOUNTER — HOSPITAL ENCOUNTER (OUTPATIENT)
Dept: NUCLEAR MEDICINE | Age: 52
Discharge: HOME | End: 2022-06-25
Payer: COMMERCIAL

## 2022-06-23 VITALS — SYSTOLIC BLOOD PRESSURE: 111 MMHG | HEART RATE: 82 BPM | DIASTOLIC BLOOD PRESSURE: 73 MMHG

## 2022-06-23 DIAGNOSIS — I20.9 ANGINA PECTORIS (HCC): ICD-10-CM

## 2022-06-23 PROCEDURE — 6360000002 HC RX W HCPCS: Performed by: INTERNAL MEDICINE

## 2022-06-23 PROCEDURE — A9500 TC99M SESTAMIBI: HCPCS | Performed by: STUDENT IN AN ORGANIZED HEALTH CARE EDUCATION/TRAINING PROGRAM

## 2022-06-23 PROCEDURE — 93017 CV STRESS TEST TRACING ONLY: CPT

## 2022-06-23 PROCEDURE — 78452 HT MUSCLE IMAGE SPECT MULT: CPT

## 2022-06-23 PROCEDURE — 3430000000 HC RX DIAGNOSTIC RADIOPHARMACEUTICAL: Performed by: STUDENT IN AN ORGANIZED HEALTH CARE EDUCATION/TRAINING PROGRAM

## 2022-06-23 RX ORDER — AMINOPHYLLINE DIHYDRATE 25 MG/ML
50 INJECTION, SOLUTION INTRAVENOUS PRN
Status: DISCONTINUED | OUTPATIENT
Start: 2022-06-23 | End: 2022-06-23 | Stop reason: HOSPADM

## 2022-06-23 RX ORDER — SODIUM CHLORIDE 0.9 % (FLUSH) 0.9 %
10 SYRINGE (ML) INJECTION PRN
Status: DISCONTINUED | OUTPATIENT
Start: 2022-06-23 | End: 2022-06-23 | Stop reason: HOSPADM

## 2022-06-23 RX ADMIN — TETRAKIS(2-METHOXYISOBUTYLISOCYANIDE)COPPER(I) TETRAFLUOROBORATE 30 MILLICURIE: 1 INJECTION, POWDER, LYOPHILIZED, FOR SOLUTION INTRAVENOUS at 09:19

## 2022-06-23 RX ADMIN — REGADENOSON 0.4 MG: 0.08 INJECTION, SOLUTION INTRAVENOUS at 08:25

## 2022-06-23 RX ADMIN — TETRAKIS(2-METHOXYISOBUTYLISOCYANIDE)COPPER(I) TETRAFLUOROBORATE 10 MILLICURIE: 1 INJECTION, POWDER, LYOPHILIZED, FOR SOLUTION INTRAVENOUS at 09:19

## 2022-06-23 NOTE — PROGRESS NOTES
Lexiscan administered, pt experiences shortness of breath. 08:29 Pt reports her legs ache. 08:31 Pt denies chest pain or shortness of breath. This part of test completed, pt given snack and beverage.

## 2022-06-24 ENCOUNTER — TELEPHONE (OUTPATIENT)
Dept: FAMILY MEDICINE CLINIC | Age: 52
End: 2022-06-24

## 2022-06-24 ENCOUNTER — PATIENT MESSAGE (OUTPATIENT)
Dept: FAMILY MEDICINE CLINIC | Age: 52
End: 2022-06-24

## 2022-06-24 NOTE — PROCEDURES
[place] [address]                              CARDIAC STRESS TEST    PATIENT NAME: Taylor Wynn                  :        1970  MED REC NO:   [id number]                              ROOM:  ACCOUNT NO:   [de-identified]                           ADMIT DATE: 2022  PROVIDER:     Clayton Hughes MD    DATE OF STUDY:  2022    Cardiovascular Diagnostics Department    Ordering Provider:  Tatyana Galeano MD    Primary Care Provider:  Ralph Youssef. Jonathan Mckeon    Interpreting Physician:  Clayton Hughes MD    MYOCARDIAL PERFUSION STRESS IMAGING    The stress ECG results are reported separately. NUCLEAR IMAGING RESULTS:  The overall quality of the study is fair. No  significant attenuation artifact was seen. There is no evidence of  abnormal lung uptake. Additionally, the right ventricle appears normal.  The left ventricular cavity is noted to be normal in size on stress  images. There is no evidence of transient ischemic dilatation (TID) of  the left ventricle. Gated SPECT imaging reveals normal myocardial thickening and wall motion  with a calculated left ventricular ejection fraction (EF) of 77%. The rest images demonstrate homogenous tracer distribution throughout  the myocardium. SPECT images demonstrate homogenous tracer distribution throughout the  myocardium. IMPRESSION:  1. Normal myocardial perfusion imaging without evidence of significant  myocardial ischemia or infarction. 2.  Global left ventricular systolic function was normal with an EF of  77% without regional wall motion abnormalities. Overall these results are most consistent with a low risk scan. The results of this test were discussed with Dr. Suzi Hitchcock on 2022  at [phone].         Armand Abreu MD    D: 2022 13:53:46       T: 2022 13:54:55     [email or web address]  Job#: [id number]     Doc#: Unknown    CC:  Dorie Prajapati DO

## 2022-06-27 ENCOUNTER — PATIENT MESSAGE (OUTPATIENT)
Dept: FAMILY MEDICINE CLINIC | Age: 52
End: 2022-06-27

## 2022-06-27 NOTE — PROCEDURES
[place] [address]                              CARDIAC STRESS TEST    PATIENT NAME: Peter Hairston                  :        1970  MED REC NO:   [id number]                              ROOM:  ACCOUNT NO:   [de-identified]                           ADMIT DATE: 2022  PROVIDER:     Marsha Barrientos      DATE OF STUDY:  2022    LEXISCAN CARDIOLITE STRESS TEST:    IMPRESSION:  1. We gave 0.4 mg of Lexiscan intravenously. 2.  This was followed in 20 seconds by Cardiolite infusion. 3.  There was no chest pain. 4.  There was no ST depression. 5.  It was an overall negative Lexiscan stress test.  6.  Cardiolite to follow. Kavita Warren    D: 2022 6:47:29       T: 2022 8:07:44     [email or web address]  Job#: [id number]     Doc#: [id number]    CC:  Anny Argueta.  Meng Perdomo DO

## 2022-06-27 NOTE — LETTER
[facility]  [address] [place] [address]  Phone: [phone]  Fax: [phone] Zijjyi, DO        June 27, 2022     Patient: Janis Marcum   YOB: 1970   Date of Visit: 6/27/2022       To Whom It May Concern:    Please excuse the above name patient from work 06/23/2022- 06/26/2022 may return to work 06/27/2022    If you have any questions or concerns, please don't hesitate to call.     Sincerely,        Jerry Goes, DO

## 2022-06-27 NOTE — TELEPHONE ENCOUNTER
From: Mary Mojica  To: Dr. Caesar Crawford  Sent: 6/27/2022 6:33 AM EDT  Subject: Doctors excuse    I need an excuse for work. Off Thursday and return to work on Monday the 27th.  You can fax it to the same fax number as you sent the [facility] papers to

## 2022-07-07 NOTE — TELEPHONE ENCOUNTER
From: Bruce Polk  Sent: 7/7/2022 8:50 AM EDT  To: Lord Macias [facility]  Subject: More good news     Thank you, do I need to make another appointment to have my arteries checked now?  I got up last night at 1 am and had to take an aspirin for chest and neck pain

## 2022-08-16 ENCOUNTER — HOSPITAL ENCOUNTER (EMERGENCY)
Age: 52
Discharge: HOME | End: 2022-08-16
Attending: EMERGENCY MEDICINE
Payer: COMMERCIAL

## 2022-08-16 VITALS
WEIGHT: 140.3 LBS | RESPIRATION RATE: 18 BRPM | BODY MASS INDEX: 26.49 KG/M2 | OXYGEN SATURATION: 97 % | SYSTOLIC BLOOD PRESSURE: 123 MMHG | TEMPERATURE: 98.2 F | HEIGHT: 61 IN | HEART RATE: 76 BPM | DIASTOLIC BLOOD PRESSURE: 72 MMHG

## 2022-08-16 DIAGNOSIS — M25.561 ACUTE PAIN OF RIGHT KNEE: Primary | ICD-10-CM

## 2022-08-16 LAB — D-DIMER QUANTITATIVE: 0.34 MG/L FEU (ref 0–0.59)

## 2022-08-16 PROCEDURE — 99283 EMERGENCY DEPT VISIT LOW MDM: CPT

## 2022-08-16 PROCEDURE — 36415 COLL VENOUS BLD VENIPUNCTURE: CPT

## 2022-08-16 PROCEDURE — 85379 FIBRIN DEGRADATION QUANT: CPT

## 2022-08-16 ASSESSMENT — PAIN DESCRIPTION - ORIENTATION: ORIENTATION: RIGHT

## 2022-08-16 ASSESSMENT — PAIN SCALES - GENERAL: PAINLEVEL_OUTOF10: 5

## 2022-08-16 ASSESSMENT — PAIN - FUNCTIONAL ASSESSMENT
PAIN_FUNCTIONAL_ASSESSMENT: 0-10
PAIN_FUNCTIONAL_ASSESSMENT: ACTIVITIES ARE NOT PREVENTED

## 2022-08-16 ASSESSMENT — PAIN DESCRIPTION - DESCRIPTORS: DESCRIPTORS: PRESSURE

## 2022-08-16 ASSESSMENT — PAIN DESCRIPTION - LOCATION: LOCATION: KNEE

## 2022-08-16 ASSESSMENT — PAIN DESCRIPTION - PAIN TYPE: TYPE: ACUTE PAIN

## 2022-08-16 ASSESSMENT — PAIN DESCRIPTION - ONSET: ONSET: ON-GOING

## 2022-08-16 ASSESSMENT — PAIN DESCRIPTION - FREQUENCY: FREQUENCY: CONTINUOUS

## 2022-08-16 NOTE — ED PROVIDER NOTES
eMERGENCY dEPARTMENT eNCOUnter        [address] [facility]    Chief Complaint   Patient presents with    Leg Swelling     Patient reports chest pain starting last night around 1130 pm. Patient took an aspirin and has had right leg swelling since. Reports the swelling has happened before after taking aspirin. Pain travelled up right leg and is hurting behind the right knee. Denies chest pain today. HPI    Eve Kebede is a 46 y.o. female who presents to ED from home. By car. With complaint of R leg swelling. Patient states the symptoms started around 11 PM last night. Patient takes aspirin for chest pain. Patient states that she took aspirin last night. Patient had noticed that she gets swelling of the right leg after taking aspirin. No shortness of breath no hives no other symptoms. Patient mostly complains of right knee pain right leg tightness. No swelling at the time of exam.  Patient denies shortness of breath, denies fever or chills.         REVIEW OF SYSTEMS    All systems reviewed and positives are in the HPI      PAST MEDICAL HISTORY    Past Medical History:   Diagnosis Date    Asthma     Glaucoma     Closed fior Glaucoma       SURGICAL HISTORY    Past Surgical History:   Procedure Laterality Date    CARPAL TUNNEL RELEASE      CHOLECYSTECTOMY      HYSTERECTOMY (CERVIX STATUS UNKNOWN)      SHOULDER SURGERY      x 3, left    TUBAL LIGATION         CURRENT MEDICATIONS    Current Outpatient Rx   Medication Sig Dispense Refill    omeprazole (PRILOSEC) 40 MG delayed release capsule Take 1 capsule by mouth every morning (before breakfast) (Patient not taking: Reported on 6/23/2022) 30 capsule 0    pantoprazole (PROTONIX) 40 MG tablet Take 1 tablet by mouth every morning (before breakfast) (Patient not taking: Reported on 6/23/2022) 30 tablet 2    Magnesium Cl-Calcium Carbonate (SLOW-MAG PO) Take by mouth Takes 1 tab in the am and 1 tab in the PM      latanoprost (XALATAN) 0.005 % ophthalmic solution Constitutional:  Well developed, well nourished, no acute distress, non-toxic appearance   HENT:  Atraumatic, external ears normal, nose normal, oropharynx moist.  Neck- normal range of motion, no tenderness, supple   Respiratory:  No respiratory distress, normal breath sounds. Cardiovascular:  Normal rate, normal rhythm, no murmurs, no gallops, no rubs   GI:  Soft, nondistended, normal bowel sounds, nontender   Musculoskeletal: Right calf and right leg without swelling right knee without swelling. Skin is normal to touch no redness. Not warm to touch. Integument:  Well hydrated, no rash   Neurologic: Alert and oriented x3 no focal deficits. RADIOLOGY/PROCEDURES    No orders to display         Labs  Labs Reviewed   D-DIMER, QUANTITATIVE             Summation      Patient Course: D-dimer is drawn in ED. Negative D-dimer. Patient has no redness of the right knee, no swelling not warm to touch. The warning signs were discussed the patient. Patient will be sent home to follow-up with her primary care provider. Return to ED if worse. ED Medications administered this visit:  Medications - No data to display    New Prescriptions from this visit:    New Prescriptions    No medications on file       Follow-up:  Samantha Birmingham DO  [address] [address] [phone]    In 3 days  As needed, If symptoms worsen      Final Impression:   1.  Acute pain of right knee               (Please note that portions of this note were completed with a voice recognition program.  Efforts were made to edit the dictations but occasionally words are mis-transcribed.)          Aleksandra Khan MD  08/16/22 0646

## 2022-08-16 NOTE — LETTER
[facility] ED  [address], [address]  Phone: [phone]               August 16, 2022    Patient: Thiago Lance   YOB: 1970   Date of Visit: 8/16/2022       To Whom It May Concern: Thiago Lance was seen and treated in our emergency department on 8/16/2022. She may return to work on 08/17/2022.       Sincerely,       Ester Grande RN         Signature:__________________________________

## 2022-08-18 ENCOUNTER — OFFICE VISIT (OUTPATIENT)
Dept: FAMILY MEDICINE CLINIC | Age: 52
End: 2022-08-18
Payer: COMMERCIAL

## 2022-08-18 VITALS
BODY MASS INDEX: 26.32 KG/M2 | RESPIRATION RATE: 18 BRPM | OXYGEN SATURATION: 98 % | HEIGHT: 61 IN | DIASTOLIC BLOOD PRESSURE: 78 MMHG | HEART RATE: 81 BPM | SYSTOLIC BLOOD PRESSURE: 112 MMHG | WEIGHT: 139.4 LBS

## 2022-08-18 DIAGNOSIS — M54.2 NECK PAIN: ICD-10-CM

## 2022-08-18 DIAGNOSIS — M27.0 TORUS PALATINUS: ICD-10-CM

## 2022-08-18 DIAGNOSIS — M79.604 ACUTE PAIN OF RIGHT LOWER EXTREMITY: Primary | ICD-10-CM

## 2022-08-18 DIAGNOSIS — M79.89 SWELLING OF RIGHT LOWER EXTREMITY: ICD-10-CM

## 2022-08-18 DIAGNOSIS — R07.9 RECURRENT CHEST PAIN: ICD-10-CM

## 2022-08-18 DIAGNOSIS — R29.898 BILATERAL ARM WEAKNESS: ICD-10-CM

## 2022-08-18 PROCEDURE — 99214 OFFICE O/P EST MOD 30 MIN: CPT | Performed by: STUDENT IN AN ORGANIZED HEALTH CARE EDUCATION/TRAINING PROGRAM

## 2022-08-18 RX ORDER — ASPIRIN 81 MG/1
81 TABLET ORAL DAILY
COMMUNITY
End: 2022-08-18 | Stop reason: CLARIF

## 2022-08-18 RX ORDER — ASPIRIN 325 MG
325 TABLET ORAL DAILY
COMMUNITY

## 2022-08-18 ASSESSMENT — ENCOUNTER SYMPTOMS
ABDOMINAL PAIN: 0
BACK PAIN: 0
RHINORRHEA: 0
NAUSEA: 0
VOMITING: 0
DIARRHEA: 0
WHEEZING: 0
SINUS PAIN: 0
COUGH: 0

## 2022-08-18 NOTE — PROGRESS NOTES
HPI Notes    Name: Faraz Corral  : 1970         Chief Complaint:     Chief Complaint   Patient presents with    Leg Swelling     Leg swelling onset Tuesday started with CP neck pain , and jaw pain states she took an aspirin Monday woke up with RT leg swollen and a golf ball lump        History of Present Illness:      HPI    This is a 59-year-old woman with medical history significant for recurrent chest pain, which she reports always improves with aspirin. She has undergone a thorough cardiac work-up which is not been concerning for acute coronary syndrome or coronary artery disease, or anginal equivalent. She reports that 3 days ago she began to experience chest pain which was again relieved by aspirin but she developed subsequent right lower extremity swelling of a \"golf ball sized lump behind the right knee, as well as swelling encompassing the knee to ankle\". She also complained of bluish skin discoloration of the dorsum of the right foot. She sought evaluation at the emergency room for this complaint on 2020. I did have an opportunity to review that documentation. D-dimer was drawn, and was found to be negative. She was discharged home. Since returning home, she reports that her swelling and pain have completely resolved. She does however report that \"now my arms are getting weak\". She reports she \"feels like I have to struggle to pick my arms up\". She does not report any numbness/tingling, or decreased sensation. This has been ongoing for about two months at this point. She also endorses decreased  strength, and generally \"feels like my muscles are heavy and overworked\". She is not dropping items around the house, however. She does have one additional complaint which is a \"cyst on the roof of my mouth which swells after I eat chips or crunchy food\". This has been there for 15+ years and is very irritating to her.      Past Medical History:     Past Medical History:   Diagnosis Date    Asthma     Glaucoma     Closed fior Glaucoma      Reviewed all health maintenance requirements and ordered appropriate tests  Health Maintenance Due   Topic Date Due    COVID-19 Vaccine (1) Never done    Pneumococcal 0-64 years Vaccine (2 - PCV) 12/06/2011    Colorectal Cancer Screen  Never done    Breast cancer screen  Never done    Shingles vaccine (1 of 2) Never done       Past Surgical History:     Past Surgical History:   Procedure Laterality Date    CARPAL TUNNEL RELEASE      CHOLECYSTECTOMY      HYSTERECTOMY (CERVIX STATUS UNKNOWN)      SHOULDER SURGERY      x 3, left    TUBAL LIGATION          Medications:       Prior to Admission medications    Medication Sig Start Date End Date Taking? Authorizing Provider   aspirin 325 MG tablet Take 325 mg by mouth daily   Yes Historical Provider, MD   Magnesium Cl-Calcium Carbonate (SLOW-MAG PO) Take by mouth Takes 1 tab in the am and 1 tab in the PM   Yes Historical Provider, MD   latanoprost (XALATAN) 0.005 % ophthalmic solution  2/7/22  Yes Historical Provider, MD   fluticasone (FLONASE) 50 MCG/ACT nasal spray 2 sprays by Nasal route daily 4/1/19 1/5/22  Historical Provider, MD   ibuprofen (ADVIL;MOTRIN) 600 MG tablet Take 600 mg by mouth every 6 hours as needed  Patient not taking: Reported on 8/18/2022    Historical Provider, MD        Allergies:       Adhesive tape, Codeine, Demerol hcl [meperidine], Dexamethasone, Hydrocodone-acetaminophen, Oxycodone-acetaminophen, and Prednisone    Social History:     Tobacco:    reports that she has been smoking cigarettes. She has a 15.00 pack-year smoking history. She has never used smokeless tobacco.  Alcohol:      reports that she does not currently use alcohol. Drug Use:  reports no history of drug use.     Family History:     Family History   Problem Relation Age of Onset    Thyroid Disease Mother     High Blood Pressure Mother     Cancer Mother     Cancer Father     Hypotension Sister     Lupus Sister     High Blood Pressure Brother     High Blood Pressure Brother        Review of Systems:     Review of Systems   Constitutional:  Negative for fever. HENT:  Negative for rhinorrhea, sinus pain and sneezing. Respiratory:  Negative for cough and wheezing. Cardiovascular:  Negative for chest pain and leg swelling. Gastrointestinal:  Negative for abdominal pain, diarrhea, nausea and vomiting. Musculoskeletal:  Negative for back pain. Heaviness of the bilateral arms   Skin:  Negative for rash. Neurological:  Negative for headaches. Psychiatric/Behavioral:  Negative for sleep disturbance. Physical Exam:     Vitals:  /78 (Site: Left Upper Arm, Position: Sitting, Cuff Size: Medium Adult)   Pulse 81   Resp 18   Ht 5' 1\" (1.549 m)   Wt 139 lb 6.4 oz (63.2 kg)   SpO2 98%   BMI 26.34 kg/m²     Physical Exam  Vitals and nursing note reviewed. Constitutional:       General: She is not in acute distress. Appearance: Normal appearance. She is normal weight. HENT:      Mouth/Throat:      Comments: Palatial torus with prominence of the patient's right aspect. Cardiovascular:      Rate and Rhythm: Normal rate and regular rhythm. Pulses: Normal pulses. Heart sounds: Normal heart sounds. No murmur heard. Pulmonary:      Effort: Pulmonary effort is normal. No respiratory distress. Breath sounds: Normal breath sounds. Skin:     General: Skin is warm and dry. Capillary Refill: Capillary refill takes less than 2 seconds. Neurological:      General: No focal deficit present. Mental Status: She is alert and oriented to person, place, and time. Mental status is at baseline. Cranial Nerves: No cranial nerve deficit. Motor: No weakness. Deep Tendon Reflexes: Reflexes normal.      Reflex Scores:       Brachioradialis reflexes are 2+ on the right side and 2+ on the left side.      Comments: 5/5 strength bilaterally for upper extremities   Psychiatric: Mood and Affect: Mood normal.         Behavior: Behavior normal.         Thought Content: Thought content normal.       Data:     Lab Results   Component Value Date/Time     01/24/2018 06:15 PM    K 3.8 01/24/2018 06:15 PM     01/24/2018 06:15 PM    CO2 25 01/24/2018 06:15 PM    BUN 11 01/24/2018 06:15 PM    CREATININE 0.66 01/24/2018 06:15 PM    GLUCOSE 96 01/24/2018 06:15 PM    PROT 7.7 04/29/2013 10:10 AM    LABALBU 4.7 04/29/2013 10:10 AM    BILITOT 0.29 04/29/2013 10:10 AM    ALKPHOS 127 04/29/2013 10:10 AM    AST 59 04/29/2013 10:10 AM     04/29/2013 10:10 AM     Lab Results   Component Value Date/Time    WBC 7.8 01/24/2018 06:15 PM    RBC 4.45 01/24/2018 06:15 PM    HGB 13.3 01/24/2018 06:15 PM    HCT 39.9 01/24/2018 06:15 PM    MCV 89.6 01/24/2018 06:15 PM    MCH 30.0 01/24/2018 06:15 PM    MCHC 33.4 01/24/2018 06:15 PM    RDW 14.5 01/24/2018 06:15 PM     01/24/2018 06:15 PM    MPV NOT REPORTED 01/24/2018 06:15 PM     No results found for: TSH  Lab Results   Component Value Date/Time    CHOL 204 02/12/2022 08:16 AM    HDL 39 02/12/2022 08:16 AM    LABA1C 5.7 02/10/2022 03:42 PM          Assessment & Plan        Diagnosis Orders   1. Acute pain of right lower extremity        2. Swelling of right lower extremity        3. Recurrent chest pain  US CAROTID ARTERY BILATERAL      4. Neck pain  US CAROTID ARTERY BILATERAL      5. Bilateral arm weakness        6. Torus palatinus            1.--4. Chest pain, RLE swelling have resolved. She has a reassuring exam today and I am puzzled by the overall eitology of her complaints. GERD, gastritis, ACS, CAD, DVT have been appreciably ruled out. I would consider carotid duplex to exclude stenosis, but acknowledge that this would likely not cause RLE symptoms. 5. History is very puzzling; especially episodic nature. I did offer EMG to work this up, Lurena Schilder declined today.      6. Based on history, PE, there does not appear to be a cystic component of this torus; I believe she is likely traumatizing a ridge or prominence of the bony torus, which is leading to transient edema and inflammation of the overlying soft tissue. I encouraged her to discuss this with her dentist.     Additional entities within differential include illness-anxiety disorder. Follow up as needed based on carotid imaging. I spent approximately 38 minutes with this patient     Completed Refills   Requested Prescriptions      No prescriptions requested or ordered in this encounter     Return if symptoms worsen or fail to improve. No orders of the defined types were placed in this encounter. Orders Placed This Encounter   Procedures    US CAROTID ARTERY BILATERAL     Standing Status:   Future     Standing Expiration Date:   8/18/2023     Order Specific Question:   Reason for exam:     Answer:   eval for carotid stenosis       There are no Patient Instructions on file for this visit.     Electronically signed by Jerica Lopez DO on 8/18/2022 at 9:46 AM     Completed Refills   Requested Prescriptions      No prescriptions requested or ordered in this encounter

## 2022-08-23 ENCOUNTER — HOSPITAL ENCOUNTER (OUTPATIENT)
Dept: VASCULAR LAB | Age: 52
Discharge: HOME | End: 2022-08-25
Payer: COMMERCIAL

## 2022-08-23 DIAGNOSIS — R07.9 RECURRENT CHEST PAIN: ICD-10-CM

## 2022-08-23 DIAGNOSIS — M54.2 NECK PAIN: ICD-10-CM

## 2022-08-23 PROCEDURE — 93880 EXTRACRANIAL BILAT STUDY: CPT

## 2022-11-04 ENCOUNTER — TELEPHONE (OUTPATIENT)
Dept: FAMILY MEDICINE CLINIC | Age: 52
End: 2022-11-04

## 2022-11-04 NOTE — TELEPHONE ENCOUNTER
LVM to advise she is due for Breast cancer screening and if she would like the order placed I can also schedule it for her

## 2023-01-04 ENCOUNTER — HOSPITAL ENCOUNTER (OUTPATIENT)
Age: 53
Discharge: HOME | End: 2023-01-06
Payer: COMMERCIAL

## 2023-01-04 ENCOUNTER — OFFICE VISIT (OUTPATIENT)
Dept: FAMILY MEDICINE CLINIC | Age: 53
End: 2023-01-04
Payer: COMMERCIAL

## 2023-01-04 ENCOUNTER — HOSPITAL ENCOUNTER (OUTPATIENT)
Dept: GENERAL RADIOLOGY | Age: 53
Discharge: HOME | End: 2023-01-06
Payer: COMMERCIAL

## 2023-01-04 VITALS
BODY MASS INDEX: 26.64 KG/M2 | WEIGHT: 141 LBS | SYSTOLIC BLOOD PRESSURE: 100 MMHG | HEART RATE: 91 BPM | DIASTOLIC BLOOD PRESSURE: 60 MMHG | OXYGEN SATURATION: 96 %

## 2023-01-04 DIAGNOSIS — M79.675 TOE PAIN, LEFT: ICD-10-CM

## 2023-01-04 DIAGNOSIS — M79.675 TOE PAIN, LEFT: Primary | ICD-10-CM

## 2023-01-04 DIAGNOSIS — R19.7 VOMITING AND DIARRHEA: ICD-10-CM

## 2023-01-04 DIAGNOSIS — R11.10 VOMITING AND DIARRHEA: ICD-10-CM

## 2023-01-04 LAB
INFLUENZA A ANTIGEN, POC: NEGATIVE
INFLUENZA B ANTIGEN, POC: NEGATIVE
LOT NUMBER POC: 22
SARS-COV-2 RNA POC - COV: NORMAL
VALID INTERNAL CONTROL, POC: PRESENT
VENDOR AND KIT NAME POC: NORMAL

## 2023-01-04 PROCEDURE — 73630 X-RAY EXAM OF FOOT: CPT

## 2023-01-04 PROCEDURE — 99213 OFFICE O/P EST LOW 20 MIN: CPT | Performed by: STUDENT IN AN ORGANIZED HEALTH CARE EDUCATION/TRAINING PROGRAM

## 2023-01-04 ASSESSMENT — PATIENT HEALTH QUESTIONNAIRE - PHQ9
SUM OF ALL RESPONSES TO PHQ QUESTIONS 1-9: 0
1. LITTLE INTEREST OR PLEASURE IN DOING THINGS: 0
SUM OF ALL RESPONSES TO PHQ QUESTIONS 1-9: 0
SUM OF ALL RESPONSES TO PHQ QUESTIONS 1-9: 0
SUM OF ALL RESPONSES TO PHQ9 QUESTIONS 1 & 2: 0
SUM OF ALL RESPONSES TO PHQ QUESTIONS 1-9: 0
2. FEELING DOWN, DEPRESSED OR HOPELESS: 0

## 2023-01-04 ASSESSMENT — ENCOUNTER SYMPTOMS
RHINORRHEA: 0
SINUS PAIN: 0
ABDOMINAL PAIN: 0
WHEEZING: 0
DIARRHEA: 1
COUGH: 0
BACK PAIN: 0
VOMITING: 1
NAUSEA: 1

## 2023-01-04 NOTE — PROGRESS NOTES
HPI Notes    Name: Violeta Schofield  : 1970         Chief Complaint:     Chief Complaint   Patient presents with    Toe Injury     Injured left toe on Monday. Pain and swelling    Nausea & Vomiting     N/V with diarrhea started lastnight       History of Present Illness:      HPI    This is a 72-year-old woman presenting for evaluation of left fifth toe pain. She is concerned that there may be a fracture. She actually injured this at home, after accidentally kicking a door, 2 days ago. The toe was swollen and rather painful. Additionally, she reports that since this morning, she has been very nauseated and has also vomited several times. She is also having diarrhea and chills, without melena, hematochezia. She denies fevers, , myalgias, arthralgias, cough, shortness of air, anosmia, dysgeusia, rhinorrhea, postnasal drip. She has been exposed to sick contacts through her son's family, who had influenza and she is concerned she has the same. No new foods, travel, eating at restaurants. Past Medical History:     Past Medical History:   Diagnosis Date    Asthma     Glaucoma     Closed fior Glaucoma      Reviewed all health maintenance requirements and ordered appropriate tests  Health Maintenance Due   Topic Date Due    COVID-19 Vaccine (1) Never done    Colorectal Cancer Screen  Never done    Breast cancer screen  Never done    Shingles vaccine (1 of 2) Never done    Flu vaccine (1) 2022       Past Surgical History:     Past Surgical History:   Procedure Laterality Date    CARPAL TUNNEL RELEASE      CHOLECYSTECTOMY      HYSTERECTOMY (CERVIX STATUS UNKNOWN)      SHOULDER SURGERY      x 3, left    TUBAL LIGATION          Medications:       Prior to Admission medications    Medication Sig Start Date End Date Taking?  Authorizing Provider   aspirin 325 MG tablet Take 325 mg by mouth daily   Yes Historical Provider, MD   Magnesium Cl-Calcium Carbonate (SLOW-MAG PO) Take by mouth Takes 1 tab in the am and 1 tab in the PM   Yes Historical Provider, MD   latanoprost (XALATAN) 0.005 % ophthalmic solution  2/7/22  Yes Historical Provider, MD   fluticasone (FLONASE) 50 MCG/ACT nasal spray 2 sprays by Nasal route daily 4/1/19 1/5/22  Historical Provider, MD   ibuprofen (ADVIL;MOTRIN) 600 MG tablet Take 600 mg by mouth every 6 hours as needed  Patient not taking: No sig reported    Historical Provider, MD        Allergies:       Adhesive tape, Codeine, Demerol hcl [meperidine], Dexamethasone, Hydrocodone-acetaminophen, Oxycodone-acetaminophen, and Prednisone    Social History:     Tobacco:    reports that she has been smoking cigarettes. She has a 15.00 pack-year smoking history. She has never used smokeless tobacco.  Alcohol:      reports that she does not currently use alcohol. Drug Use:  reports no history of drug use. Family History:     Family History   Problem Relation Age of Onset    Thyroid Disease Mother     High Blood Pressure Mother     Cancer Mother     Cancer Father     Hypotension Sister     Lupus Sister     High Blood Pressure Brother     High Blood Pressure Brother        Review of Systems:         Review of Systems   Constitutional:  Negative for fever. HENT:  Negative for rhinorrhea, sinus pain and sneezing. Respiratory:  Negative for cough and wheezing. Cardiovascular:  Negative for chest pain. Gastrointestinal:  Positive for diarrhea, nausea and vomiting. Negative for abdominal pain. Genitourinary:  Negative for menstrual problem and vaginal discharge. Musculoskeletal:  Negative for back pain. Skin:  Negative for rash. Neurological:  Negative for headaches. Psychiatric/Behavioral:  Negative for sleep disturbance. Physical Exam:     Vitals:  /60   Pulse 91   Wt 141 lb (64 kg)   SpO2 96%   BMI 26.64 kg/m²       Physical Exam  Vitals and nursing note reviewed. Constitutional:       General: She is not in acute distress. Appearance: Normal appearance.  She is normal weight. Cardiovascular:      Rate and Rhythm: Normal rate and regular rhythm. Pulses: Normal pulses. Heart sounds: Normal heart sounds. No murmur heard. Pulmonary:      Effort: Pulmonary effort is normal. No respiratory distress. Breath sounds: Normal breath sounds. No wheezing or rhonchi. Abdominal:      General: Abdomen is flat. Bowel sounds are normal. There is no distension. Tenderness: There is no abdominal tenderness. There is no guarding or rebound. Musculoskeletal:      Comments: Left little toe swollen, markedly tender to manipulation, with medial bruising. Tap test positive, painful to palpation of fifth metatarsal head. Skin:     General: Skin is warm and dry. Capillary Refill: Capillary refill takes less than 2 seconds. Neurological:      General: No focal deficit present. Mental Status: She is alert and oriented to person, place, and time. Mental status is at baseline. Cranial Nerves: No cranial nerve deficit. Psychiatric:         Mood and Affect: Mood normal.         Behavior: Behavior normal.         Thought Content:  Thought content normal.               Data:     Lab Results   Component Value Date/Time     01/24/2018 06:15 PM    K 3.8 01/24/2018 06:15 PM     01/24/2018 06:15 PM    CO2 25 01/24/2018 06:15 PM    BUN 11 01/24/2018 06:15 PM    CREATININE 0.66 01/24/2018 06:15 PM    GLUCOSE 96 01/24/2018 06:15 PM    PROT 7.7 04/29/2013 10:10 AM    LABALBU 4.7 04/29/2013 10:10 AM    BILITOT 0.29 04/29/2013 10:10 AM    ALKPHOS 127 04/29/2013 10:10 AM    AST 59 04/29/2013 10:10 AM     04/29/2013 10:10 AM     Lab Results   Component Value Date/Time    WBC 7.8 01/24/2018 06:15 PM    RBC 4.45 01/24/2018 06:15 PM    HGB 13.3 01/24/2018 06:15 PM    HCT 39.9 01/24/2018 06:15 PM    MCV 89.6 01/24/2018 06:15 PM    MCH 30.0 01/24/2018 06:15 PM    MCHC 33.4 01/24/2018 06:15 PM    RDW 14.5 01/24/2018 06:15 PM     01/24/2018 06:15 PM MPV NOT REPORTED 01/24/2018 06:15 PM     No results found for: TSH  Lab Results   Component Value Date/Time    CHOL 204 02/12/2022 08:16 AM    HDL 39 02/12/2022 08:16 AM    LABA1C 5.7 02/10/2022 03:42 PM          Assessment & Plan        Diagnosis Orders   1. Toe pain, left        2. Vomiting and diarrhea  POC COVID-19, FLU A/B          1. We will order x-ray. I certainly am suspicious for fracture due to positive tap test. Management will depend on fracture location and type. In meantime, rest, ice, elevate, NSAIDs. 2.  We will order point-of-care COVID-19, influenza swab. Continue supportive care in meantime. UPDATE: swab is negative. However, this may indeed be influenza as symptoms have just begun, and body count may not be high enough to result in the positive swab. Manage empirically, symptomatically, may return to the office in 2 days if not improved for additional testing. Follow up PRN        Completed Refills   Requested Prescriptions      No prescriptions requested or ordered in this encounter     Return if symptoms worsen or fail to improve. No orders of the defined types were placed in this encounter. Orders Placed This Encounter   Procedures    POC COVID-19, FLU A/B         There are no Patient Instructions on file for this visit.     Electronically signed by Rita Lisa DO on 1/4/2023 at 8:59 AM           Completed Refills   Requested Prescriptions      No prescriptions requested or ordered in this encounter

## 2023-01-05 ENCOUNTER — TELEPHONE (OUTPATIENT)
Dept: FAMILY MEDICINE CLINIC | Age: 53
End: 2023-01-05

## 2023-01-05 ENCOUNTER — PATIENT MESSAGE (OUTPATIENT)
Dept: FAMILY MEDICINE CLINIC | Age: 53
End: 2023-01-05

## 2023-01-05 DIAGNOSIS — M79.675 TOE PAIN, LEFT: ICD-10-CM

## 2023-01-05 DIAGNOSIS — S92.355A NONDISPLACED FRACTURE OF FIFTH METATARSAL BONE, LEFT FOOT, INITIAL ENCOUNTER FOR CLOSED FRACTURE: Primary | ICD-10-CM

## 2023-01-05 NOTE — TELEPHONE ENCOUNTER
Excuse note created, I can complete [facility] paperwork when she since then.

## 2023-01-05 NOTE — TELEPHONE ENCOUNTER
Pt called in to ask if you would be willing to write her off of work for 30 days to rest and heal her toe. If so her employer would like a note stating that and they will fax over [phone] [address] / Short term paper work. .    Pt is also agreeable to see ortho she would prefer anyone but rubio.

## 2023-01-05 NOTE — LETTER
[address] [address]  Phone: [phone]  Fax: [phone]    Jorge Rodríguez DO        January 5, 2023     Patient: Maricel Adams   YOB: 1970   Date of Visit: 1/5/2023       To Whom It May Concern:    It is my medical opinion that Maricel Adams may return to work on 02/05/2023.    If you have any questions or concerns, please don't hesitate to call.    Sincerely,          Jorge Rodríguez DO

## 2023-01-06 NOTE — TELEPHONE ENCOUNTER
From: Edna DELCID  To: Manda Dickson  Sent: 1/5/2023 3:37 PM EST  Subject: work Danielle Olsen,    Your work slip is finished please  at the  Monday - Friday 8a-11:30a , 1:00pm -4:30 PM and Dr. Dinh Cuevas would be happy to complete FMLA /Short term paper work have them fax it to us [phone]. He also will be sending you to Dr. Maxi Toro which is an Ortho Dr that he recommends. They will contact you with 24 - 72 hours.      Have a great day       Thank you   Marilu Paulino

## 2023-01-19 ENCOUNTER — PATIENT MESSAGE (OUTPATIENT)
Dept: FAMILY MEDICINE CLINIC | Age: 53
End: 2023-01-19

## 2023-01-19 DIAGNOSIS — S92.355A NONDISPLACED FRACTURE OF FIFTH METATARSAL BONE, LEFT FOOT, INITIAL ENCOUNTER FOR CLOSED FRACTURE: Primary | ICD-10-CM

## 2023-01-19 NOTE — TELEPHONE ENCOUNTER
From: [place] [employer]  To: Dr. Valentin Perkins  Sent: 1/19/2023 12:17 PM EST  Subject: Orthopedic surgeon    I've been waiting a week for them to find out if they are going to change my doctor to one that will see me in the [facility] office because they want me to come to [place]. I also told them that my foot (even though in the shoe they have me) is clicking with every step hurting more and swelling. I still haven't heard from them. I would like a referral to orthopedic surgeon Dr Emily Vasquez in [address] [facility] He would be closer to me than going to [place]. Thank you.

## 2023-03-08 ENCOUNTER — TELEPHONE (OUTPATIENT)
Dept: FAMILY MEDICINE CLINIC | Age: 53
End: 2023-03-08

## 2023-03-08 DIAGNOSIS — M79.675 TOE PAIN, LEFT: Primary | ICD-10-CM

## 2023-03-08 NOTE — TELEPHONE ENCOUNTER
George Lee said her Ortho is wanting to put her on celebrex. She was wanting to know if Dr. Harini Mccauley thought that would be ok for her to be on. Please let patient know. Health Maintenance   Topic Date Due    COVID-19 Vaccine (1) Never done    Colorectal Cancer Screen  Never done    Breast cancer screen  Never done    Shingles vaccine (1 of 2) Never done    Flu vaccine (1) 08/01/2022    A1C test (Diabetic or Prediabetic)  02/10/2023    Depression Screen  01/04/2024    Lipids  02/12/2027    DTaP/Tdap/Td vaccine (3 - Td or Tdap) 01/20/2031    Pneumococcal 0-64 years Vaccine  Completed    Hepatitis A vaccine  Aged Out    Hib vaccine  Aged Out    Meningococcal (ACWY) vaccine  Aged Out    Hepatitis C screen  Discontinued    HIV screen  Discontinued             (applicable per patient's age: Cancer Screenings, Depression Screening, Fall Risk Screening, Immunizations)    Hemoglobin A1C (%)   Date Value   02/10/2022 5.7     LDL Cholesterol (mg/dL)   Date Value   02/12/2022 141 (H)     AST (U/L)   Date Value   04/29/2013 59 (H)     ALT (U/L)   Date Value   04/29/2013 118 (H)     BUN (mg/dL)   Date Value   01/24/2018 11      (goal A1C is < 7)   (goal LDL is <100) need 30-50% reduction from baseline     BP Readings from Last 3 Encounters:   01/04/23 100/60   08/18/22 112/78   08/16/22 123/72    (goal /80)      All Future Testing planned in [facility]:  Lab Frequency Next Occurrence   H. pylori antigen Once 05/26/2022       Next Visit Date:  No future appointments. Patient Active Problem List:     Nicotine dependence, cigarettes, uncomplicated     Acute closed-angle glaucoma, bilateral     Nonrheumatic mitral valve regurgitation     Overweight (BMI 25.0-29. 9)     Torus palatinus     Recurrent chest pain

## 2023-03-08 NOTE — TELEPHONE ENCOUNTER
Advised pt of provider's notes, voiced understanding pt stated she can come tomorrow to have the  BMP lab completed.

## 2023-08-21 ENCOUNTER — HOSPITAL ENCOUNTER (OUTPATIENT)
Age: 53
Discharge: HOME | End: 2023-08-23
Payer: COMMERCIAL

## 2023-08-21 ENCOUNTER — HOSPITAL ENCOUNTER (OUTPATIENT)
Age: 53
Discharge: HOME | End: 2023-08-21
Payer: COMMERCIAL

## 2023-08-21 ENCOUNTER — OFFICE VISIT (OUTPATIENT)
Dept: FAMILY MEDICINE CLINIC | Age: 53
End: 2023-08-21
Payer: COMMERCIAL

## 2023-08-21 ENCOUNTER — HOSPITAL ENCOUNTER (OUTPATIENT)
Dept: GENERAL RADIOLOGY | Age: 53
Discharge: HOME | End: 2023-08-23
Payer: COMMERCIAL

## 2023-08-21 VITALS
HEART RATE: 76 BPM | SYSTOLIC BLOOD PRESSURE: 128 MMHG | DIASTOLIC BLOOD PRESSURE: 72 MMHG | WEIGHT: 144 LBS | BODY MASS INDEX: 27.21 KG/M2 | OXYGEN SATURATION: 97 %

## 2023-08-21 DIAGNOSIS — E55.9 VITAMIN D DEFICIENCY: ICD-10-CM

## 2023-08-21 DIAGNOSIS — Z13.1 SCREENING FOR DIABETES MELLITUS (DM): ICD-10-CM

## 2023-08-21 DIAGNOSIS — Z13.6 SCREENING FOR HYPERTENSION: ICD-10-CM

## 2023-08-21 DIAGNOSIS — R06.02 SHORTNESS OF BREATH: ICD-10-CM

## 2023-08-21 DIAGNOSIS — Z00.00 GENERAL MEDICAL EXAM: ICD-10-CM

## 2023-08-21 DIAGNOSIS — E78.5: ICD-10-CM

## 2023-08-21 DIAGNOSIS — Z12.11 SCREENING FOR COLON CANCER: ICD-10-CM

## 2023-08-21 DIAGNOSIS — E66.3 OVERWEIGHT: ICD-10-CM

## 2023-08-21 DIAGNOSIS — Z00.00 GENERAL MEDICAL EXAM: Primary | ICD-10-CM

## 2023-08-21 DIAGNOSIS — Z86.19 HISTORY OF HELICOBACTER PYLORI INFECTION: ICD-10-CM

## 2023-08-21 DIAGNOSIS — Z12.31 ENCOUNTER FOR SCREENING MAMMOGRAM FOR MALIGNANT NEOPLASM OF BREAST: ICD-10-CM

## 2023-08-21 LAB
25(OH)D3 SERPL-MCNC: 20.9 NG/ML
ALBUMIN SERPL-MCNC: 3.9 G/DL (ref 3.5–5.2)
ALP SERPL-CCNC: 120 U/L (ref 35–104)
ALT SERPL-CCNC: 17 U/L (ref 5–33)
ANION GAP SERPL CALCULATED.3IONS-SCNC: 9 MMOL/L (ref 9–17)
AST SERPL-CCNC: 17 U/L
BASOPHILS # BLD: ABNORMAL K/UL (ref 0–0.2)
BASOPHILS NFR BLD: ABNORMAL % (ref 0–2)
BILIRUB SERPL-MCNC: 0.4 MG/DL (ref 0.3–1.2)
BUN SERPL-MCNC: 11 MG/DL (ref 6–20)
BUN/CREAT SERPL: 16 (ref 9–20)
CALCIUM SERPL-MCNC: 9.7 MG/DL (ref 8.6–10.4)
CHLORIDE SERPL-SCNC: 102 MMOL/L (ref 98–107)
CHOLEST SERPL-MCNC: 187 MG/DL
CHOLESTEROL/HDL RATIO: 5.5
CO2 SERPL-SCNC: 25 MMOL/L (ref 20–31)
CREAT SERPL-MCNC: 0.7 MG/DL (ref 0.5–0.9)
EOSINOPHIL # BLD: 0.07 K/UL (ref 0–0.4)
EOSINOPHILS RELATIVE PERCENT: 1 % (ref 0–5)
ERYTHROCYTE [DISTWIDTH] IN BLOOD BY AUTOMATED COUNT: 14.8 % (ref 12.1–15.2)
GFR SERPL CREATININE-BSD FRML MDRD: >60 ML/MIN/1.73M2
GLUCOSE SERPL-MCNC: 100 MG/DL (ref 70–99)
HBA1C MFR BLD: 5.8 %
HCT VFR BLD AUTO: 43.4 % (ref 36–46)
HDLC SERPL-MCNC: 34 MG/DL
HGB BLD-MCNC: 14.6 G/DL (ref 12–16)
IMM GRANULOCYTES # BLD AUTO: ABNORMAL K/UL (ref 0–0.3)
IMM GRANULOCYTES NFR BLD: ABNORMAL %
LDLC SERPL CALC-MCNC: 126 MG/DL (ref 0–130)
LYMPHOCYTES NFR BLD: 2.03 K/UL (ref 1–4.8)
LYMPHOCYTES RELATIVE PERCENT: 29 % (ref 15–40)
MCH RBC QN AUTO: 29.9 PG (ref 26–34)
MCHC RBC AUTO-ENTMCNC: 33.6 G/DL (ref 31–37)
MCV RBC AUTO: 89 FL (ref 80–100)
MONOCYTES NFR BLD: 0.63 K/UL (ref 0–1)
MONOCYTES NFR BLD: 9 % (ref 4–8)
MORPHOLOGY: ABNORMAL
NEUTROPHILS NFR BLD: 61 % (ref 47–75)
NEUTS SEG NFR BLD: 4.27 K/UL (ref 2.5–7)
PLATELET # BLD AUTO: 278 K/UL (ref 140–450)
POTASSIUM SERPL-SCNC: 4.2 MMOL/L (ref 3.7–5.3)
PROT SERPL-MCNC: 6.9 G/DL (ref 6.4–8.3)
RBC # BLD AUTO: 4.88 M/UL (ref 4–5.2)
SODIUM SERPL-SCNC: 136 MMOL/L (ref 135–144)
TRIGL SERPL-MCNC: 137 MG/DL
WBC OTHER # BLD: 7 K/UL (ref 3.5–11)

## 2023-08-21 PROCEDURE — 82306 VITAMIN D 25 HYDROXY: CPT

## 2023-08-21 PROCEDURE — 99214 OFFICE O/P EST MOD 30 MIN: CPT | Performed by: STUDENT IN AN ORGANIZED HEALTH CARE EDUCATION/TRAINING PROGRAM

## 2023-08-21 PROCEDURE — 83037 HB GLYCOSYLATED A1C HOME DEV: CPT | Performed by: STUDENT IN AN ORGANIZED HEALTH CARE EDUCATION/TRAINING PROGRAM

## 2023-08-21 PROCEDURE — 99396 PREV VISIT EST AGE 40-64: CPT | Performed by: STUDENT IN AN ORGANIZED HEALTH CARE EDUCATION/TRAINING PROGRAM

## 2023-08-21 PROCEDURE — 80061 LIPID PANEL: CPT

## 2023-08-21 PROCEDURE — 85025 COMPLETE CBC W/AUTO DIFF WBC: CPT

## 2023-08-21 PROCEDURE — 36415 COLL VENOUS BLD VENIPUNCTURE: CPT

## 2023-08-21 PROCEDURE — 80053 COMPREHEN METABOLIC PANEL: CPT

## 2023-08-21 PROCEDURE — 71046 X-RAY EXAM CHEST 2 VIEWS: CPT

## 2023-08-21 RX ORDER — CHOLESTYRAMINE 4 G/9G
1 POWDER, FOR SUSPENSION ORAL DAILY
Qty: 90 PACKET | Refills: 3 | Status: SHIPPED | OUTPATIENT
Start: 2023-08-21

## 2023-08-21 SDOH — ECONOMIC STABILITY: FOOD INSECURITY: WITHIN THE PAST 12 MONTHS, THE FOOD YOU BOUGHT JUST DIDN'T LAST AND YOU DIDN'T HAVE MONEY TO GET MORE.: NEVER TRUE

## 2023-08-21 SDOH — ECONOMIC STABILITY: HOUSING INSECURITY
IN THE LAST 12 MONTHS, WAS THERE A TIME WHEN YOU DID NOT HAVE A STEADY PLACE TO SLEEP OR SLEPT IN A SHELTER (INCLUDING NOW)?: NO

## 2023-08-21 SDOH — ECONOMIC STABILITY: INCOME INSECURITY: HOW HARD IS IT FOR YOU TO PAY FOR THE VERY BASICS LIKE FOOD, HOUSING, MEDICAL CARE, AND HEATING?: NOT HARD AT ALL

## 2023-08-21 SDOH — ECONOMIC STABILITY: FOOD INSECURITY: WITHIN THE PAST 12 MONTHS, YOU WORRIED THAT YOUR FOOD WOULD RUN OUT BEFORE YOU GOT MONEY TO BUY MORE.: NEVER TRUE

## 2023-08-21 ASSESSMENT — ENCOUNTER SYMPTOMS
SHORTNESS OF BREATH: 1
NAUSEA: 0
VOMITING: 0
RHINORRHEA: 0
COUGH: 0
BACK PAIN: 0
ABDOMINAL PAIN: 0
SINUS PAIN: 0
WHEEZING: 0
DIARRHEA: 0

## 2023-08-21 NOTE — PATIENT INSTRUCTIONS
Levi Dears you for coming to see me today! I believe that our main problem is your check up. Here's what I would recommend we do:    Get labs today  Get x ray today  We'll check your vitamin D and if you still have H. pylori  Get your cologuard done as soon as you get it  Get scheduled for a mammogram.    We also discussed your breathing. Get a x ray today and you'll get scheduled for a PFT to check lung function. Come back in 6 weeks to talk about this with me. Please review the documents I'm attaching related to what we discussed today. Please give me a call or message me on [employer] if you have any problems or questions, and I will see you at your next visit. Dr. Streeter May:    Keatontrista Ryanne may be receiving a survey from [employer] regarding your visit today. You may get this in the mail, through your [employer] or in your email. Please complete the survey to enable us to provide the highest quality of care to you and your family. If you cannot score us as very good ( 5 Stars) on any question, please feel free to call the office to discuss how we could have made your experience exceptional.     Thank you.     Clinical Care Team:  DO Destiny Silverio LPN    Triage:  Peña Wiggins, [address] [facility] Team:  Darlene Obregon

## 2023-08-21 NOTE — PROGRESS NOTES
HPI Notes    Name: Randi Tinoco  : 1970         Chief Complaint:     Chief Complaint   Patient presents with    Annual Exam     Work physical    Shortness of Breath     Patient has still been experiencing sob, weakness and pain along left side of jaw. Weight Management     Patient would like to discuss options for managing weight. History of Present Illness:        HPI    This is a 68-year-old woman presenting for a annual well visit as well as to discuss shortness of air, weakness, left sided jaw pain. Preventative: will do labs, mammogram, cologuard, vitamin D. All tests ordered. Will check for H. Pylori. Shortness of air, etc.: ongoing problem not improving. Not related to activity. Overweight: she is dissatisfied with her current weight. She relates she has gained weight slowly over the past ten years and has had a difficult time losing weight. Her principle concern is more central obesity and how to rid herself of her paunch. Past Medical History:     Past Medical History:   Diagnosis Date    Asthma     Glaucoma     Closed fior Glaucoma      Reviewed all health maintenance requirements and ordered appropriate tests  Health Maintenance Due   Topic Date Due    COVID-19 Vaccine (1) Never done    Colorectal Cancer Screen  Never done    Breast cancer screen  Never done    Shingles vaccine (1 of 2) Never done    Flu vaccine (1) 2023       Past Surgical History:     Past Surgical History:   Procedure Laterality Date    CARPAL TUNNEL RELEASE      CHOLECYSTECTOMY      HYSTERECTOMY (CERVIX STATUS UNKNOWN)      SHOULDER SURGERY      x 3, left    TUBAL LIGATION          Medications:       Prior to Admission medications    Medication Sig Start Date End Date Taking?  Authorizing Provider   cholestyramine (QUESTRAN) 4 g packet Take 1 packet by mouth daily 23  Yes Ursula Derick, DO   aspirin 325 MG tablet Take 1 tablet by mouth daily   Yes Historical Provider, MD   Magnesium

## 2023-08-30 DIAGNOSIS — E55.9 VITAMIN D DEFICIENCY: Primary | ICD-10-CM

## 2023-08-30 RX ORDER — ERGOCALCIFEROL 1.25 MG/1
50000 CAPSULE ORAL WEEKLY
Qty: 8 CAPSULE | Refills: 0 | Status: SHIPPED | OUTPATIENT
Start: 2023-08-30 | End: 2023-10-19

## 2023-09-07 LAB — NONINV COLON CA DNA+OCC BLD SCRN STL QL: NEGATIVE

## 2023-10-24 ENCOUNTER — OFFICE VISIT (OUTPATIENT)
Dept: FAMILY MEDICINE CLINIC | Age: 53
End: 2023-10-24
Payer: COMMERCIAL

## 2023-10-24 ENCOUNTER — HOSPITAL ENCOUNTER (OUTPATIENT)
Age: 53
Discharge: HOME | End: 2023-10-24
Payer: COMMERCIAL

## 2023-10-24 VITALS
WEIGHT: 142 LBS | OXYGEN SATURATION: 97 % | HEART RATE: 75 BPM | SYSTOLIC BLOOD PRESSURE: 120 MMHG | HEIGHT: 61 IN | DIASTOLIC BLOOD PRESSURE: 76 MMHG | BODY MASS INDEX: 26.81 KG/M2

## 2023-10-24 DIAGNOSIS — E78.5: ICD-10-CM

## 2023-10-24 DIAGNOSIS — E55.9 VITAMIN D DEFICIENCY: ICD-10-CM

## 2023-10-24 DIAGNOSIS — R06.02 SHORTNESS OF BREATH: ICD-10-CM

## 2023-10-24 DIAGNOSIS — M79.672 LEFT FOOT PAIN: ICD-10-CM

## 2023-10-24 DIAGNOSIS — E55.9 VITAMIN D DEFICIENCY: Primary | ICD-10-CM

## 2023-10-24 DIAGNOSIS — S00.01XA ABRASION OF SCALP, INITIAL ENCOUNTER: ICD-10-CM

## 2023-10-24 LAB — 25(OH)D3 SERPL-MCNC: 26.9 NG/ML

## 2023-10-24 PROCEDURE — 99213 OFFICE O/P EST LOW 20 MIN: CPT | Performed by: STUDENT IN AN ORGANIZED HEALTH CARE EDUCATION/TRAINING PROGRAM

## 2023-10-24 PROCEDURE — 36415 COLL VENOUS BLD VENIPUNCTURE: CPT

## 2023-10-24 PROCEDURE — 82306 VITAMIN D 25 HYDROXY: CPT

## 2023-10-24 ASSESSMENT — ENCOUNTER SYMPTOMS
NAUSEA: 0
ABDOMINAL PAIN: 0
DIARRHEA: 0
RHINORRHEA: 0
BACK PAIN: 0
CONSTIPATION: 1
WHEEZING: 0
VOMITING: 0
COUGH: 0
SINUS PAIN: 0

## 2023-10-24 NOTE — PATIENT INSTRUCTIONS
SURVEY:    You may be receiving a survey from [employer] regarding your visit today. You may get this in the mail, through your MyChart or in your email. Please complete the survey to enable us to provide the highest quality of care to you and your family. If you cannot score us as very good ( 5 Stars) on any question, please feel free to call the office to discuss how we could have made your experience exceptional.     Thank you.     Clinical Care Team:  DO Rajni Ruvalcaba LPN    Triage:  [place], [address] [facility] Team:  Sergio Marie

## 2023-10-27 ENCOUNTER — HOSPITAL ENCOUNTER (OUTPATIENT)
Age: 53
Setting detail: SPECIMEN
Discharge: HOME | End: 2023-10-27
Payer: COMMERCIAL

## 2023-10-27 DIAGNOSIS — Z86.19 HISTORY OF HELICOBACTER PYLORI INFECTION: ICD-10-CM

## 2023-10-27 PROCEDURE — 87338 HPYLORI STOOL AG IA: CPT

## 2023-10-28 LAB
MICROORGANISM/AGENT SPEC: NEGATIVE
SPECIMEN DESCRIPTION: NORMAL

## 2023-11-04 DIAGNOSIS — E55.9 VITAMIN D DEFICIENCY: ICD-10-CM

## 2023-11-06 RX ORDER — ERGOCALCIFEROL 1.25 MG/1
50000 CAPSULE ORAL WEEKLY
Qty: 8 CAPSULE | Refills: 0 | OUTPATIENT
Start: 2023-11-06

## 2023-11-06 NOTE — TELEPHONE ENCOUNTER
Last OV: 10/24/2023    Next scheduled apt: Visit date not found      Surescripts requesting refill  Medication pending

## 2023-11-30 ENCOUNTER — OFFICE VISIT (OUTPATIENT)
Dept: FAMILY MEDICINE CLINIC | Age: 53
End: 2023-11-30
Payer: COMMERCIAL

## 2023-11-30 VITALS
SYSTOLIC BLOOD PRESSURE: 110 MMHG | BODY MASS INDEX: 26.64 KG/M2 | WEIGHT: 141 LBS | DIASTOLIC BLOOD PRESSURE: 70 MMHG | HEART RATE: 80 BPM | OXYGEN SATURATION: 95 %

## 2023-11-30 DIAGNOSIS — J34.89 SINUS PAIN: ICD-10-CM

## 2023-11-30 DIAGNOSIS — D48.9 NEOPLASM OF UNCERTAIN BEHAVIOR: ICD-10-CM

## 2023-11-30 DIAGNOSIS — J01.00 ACUTE NON-RECURRENT MAXILLARY SINUSITIS: ICD-10-CM

## 2023-11-30 DIAGNOSIS — S00.01XA ABRASION OF SCALP, INITIAL ENCOUNTER: Primary | ICD-10-CM

## 2023-11-30 PROCEDURE — 99213 OFFICE O/P EST LOW 20 MIN: CPT | Performed by: STUDENT IN AN ORGANIZED HEALTH CARE EDUCATION/TRAINING PROGRAM

## 2023-11-30 RX ORDER — AMOXICILLIN AND CLAVULANATE POTASSIUM 875; 125 MG/1; MG/1
1 TABLET, FILM COATED ORAL 2 TIMES DAILY
Qty: 14 TABLET | Refills: 0 | Status: SHIPPED | OUTPATIENT
Start: 2023-11-30 | End: 2023-12-07

## 2023-11-30 ASSESSMENT — ENCOUNTER SYMPTOMS
RHINORRHEA: 0
DIARRHEA: 0
ABDOMINAL PAIN: 0
SINUS PRESSURE: 1
SINUS PAIN: 1
SORE THROAT: 1
COUGH: 0
NAUSEA: 0
BACK PAIN: 0
WHEEZING: 0
VOMITING: 0
EYE PAIN: 1

## 2023-11-30 NOTE — PROGRESS NOTES
Results   Component Value Date/Time    WBC 7.0 08/21/2023 10:16 AM    RBC 4.88 08/21/2023 10:16 AM    HGB 14.6 08/21/2023 10:16 AM    HCT 43.4 08/21/2023 10:16 AM    MCV 89.0 08/21/2023 10:16 AM    MCH 29.9 08/21/2023 10:16 AM    MCHC 33.6 08/21/2023 10:16 AM    RDW 14.8 08/21/2023 10:16 AM     08/21/2023 10:16 AM    MPV NOT REPORTED 01/24/2018 06:15 PM     No results found for: \"TSH\"  Lab Results   Component Value Date/Time    CHOL 187 08/21/2023 10:16 AM    HDL 34 08/21/2023 10:16 AM    LABA1C 5.8 08/21/2023 09:15 AM          Assessment & Plan        Diagnosis Orders   1. Abrasion of scalp, initial encounter  External Referral To Dermatology      2. Neoplasm of uncertain behavior  External Referral To Dermatology      3. Sinus pain  amoxicillin-clavulanate (AUGMENTIN) 875-125 MG per tablet      4. Acute non-recurrent maxillary sinusitis  amoxicillin-clavulanate (AUGMENTIN) 875-125 MG per tablet          Nonhealing status concerning for neoplastic change; will refer to derm to discuss bx. I would be more comfortable with dermatology handling bx as it is inside of the hairline. 3.  Likely acute bacterial sinusitis based on history, and PE. Will recommend 7 day course of Augmentin and PRN follow up if not improving. The patient I had a long discussion about starting Augmentin 875/125 mg PO BID x7days. We discussed its mechanism of action, intended goals, adverse effects, as well as common side effects. They were able to verbalize understanding, and repeat plan back to me. Follow up PRN        Completed Refills   Requested Prescriptions     Signed Prescriptions Disp Refills    amoxicillin-clavulanate (AUGMENTIN) 875-125 MG per tablet 14 tablet 0     Sig: Take 1 tablet by mouth 2 times daily for 7 days     No follow-ups on file.   Orders Placed This Encounter   Medications    amoxicillin-clavulanate (AUGMENTIN) 875-125 MG per tablet     Sig: Take 1 tablet by mouth 2 times daily for 7 days     Dispense:

## 2023-11-30 NOTE — PATIENT INSTRUCTIONS
SURVEY:    You may be receiving a survey from [employer] regarding your visit today. You may get this in the mail, through your MyChart or in your email. Please complete the survey to enable us to provide the highest quality of care to you and your family. If you cannot score us as very good ( 5 Stars) on any question, please feel free to call the office to discuss how we could have made your experience exceptional.     Thank you.     Clinical Care Team:  DO Shaquille Clark LPN    Triage:  Abdoul Blum, [address] [facility] Team:  Tamela Roper

## 2023-12-08 ENCOUNTER — TELEPHONE (OUTPATIENT)
Dept: FAMILY MEDICINE CLINIC | Age: 53
End: 2023-12-08

## 2023-12-08 DIAGNOSIS — D48.9 NEOPLASM OF UNCERTAIN BEHAVIOR: Primary | ICD-10-CM

## 2023-12-08 NOTE — TELEPHONE ENCOUNTER
Last OV: 11/30/2023    Next scheduled apt: Visit date not found      New Dermatology referral pending

## 2023-12-14 ENCOUNTER — APPOINTMENT (OUTPATIENT)
Dept: URBAN - METROPOLITAN AREA CLINIC 204 | Age: 53
Setting detail: DERMATOLOGY
End: 2023-12-15

## 2023-12-14 PROCEDURE — 17110 DESTRUCT B9 LESION 1-14: CPT

## 2023-12-14 PROCEDURE — 99203 OFFICE O/P NEW LOW 30 MIN: CPT | Mod: 25

## 2023-12-14 PROCEDURE — 17003 DESTRUCT PREMALG LES 2-14: CPT | Mod: 59

## 2023-12-14 PROCEDURE — 17000 DESTRUCT PREMALG LESION: CPT | Mod: 59

## 2023-12-14 PROCEDURE — OTHER MIPS QUALITY: OTHER

## 2023-12-14 NOTE — PROCEDURE: MIPS QUALITY
Additional Notes: Documentation for MIPS  purposes only. Full Patient visit note from paper chart is available for review and also scanned in EMA chart.
Detail Level: Detailed
Quality 226: Preventive Care And Screening: Tobacco Use: Screening And Cessation Intervention: Patient screened for tobacco use, is a smoker AND received Cessation Counseling within the Previous 12 Months
Additional Notes: Patient received verbal cessation counseling (3 min or less)

## 2023-12-29 ENCOUNTER — OFFICE VISIT (OUTPATIENT)
Dept: FAMILY MEDICINE CLINIC | Age: 53
End: 2023-12-29
Payer: COMMERCIAL

## 2023-12-29 VITALS
BODY MASS INDEX: 26.64 KG/M2 | WEIGHT: 141 LBS | SYSTOLIC BLOOD PRESSURE: 98 MMHG | DIASTOLIC BLOOD PRESSURE: 70 MMHG | HEART RATE: 80 BPM | OXYGEN SATURATION: 95 % | TEMPERATURE: 98.3 F

## 2023-12-29 DIAGNOSIS — U07.1 COVID-19: ICD-10-CM

## 2023-12-29 DIAGNOSIS — G44.83 COUGH HEADACHE: Primary | ICD-10-CM

## 2023-12-29 DIAGNOSIS — R50.9 ACUTE FEBRILE ILLNESS: ICD-10-CM

## 2023-12-29 LAB
INFLUENZA A ANTIGEN, POC: NEGATIVE
INFLUENZA B ANTIGEN, POC: NEGATIVE
LOT NUMBER POC: ABNORMAL
SARS-COV-2 RNA POC - COV: DETECTED
VALID INTERNAL CONTROL, POC: PRESENT
VENDOR AND KIT NAME POC: ABNORMAL

## 2023-12-29 PROCEDURE — 99213 OFFICE O/P EST LOW 20 MIN: CPT | Performed by: STUDENT IN AN ORGANIZED HEALTH CARE EDUCATION/TRAINING PROGRAM

## 2023-12-29 NOTE — PATIENT INSTRUCTIONS
SURVEY:    You may be receiving a survey from [employer] regarding your visit today. You may get this in the mail, through your MyChart or in your email. Please complete the survey to enable us to provide the highest quality of care to you and your family. If you cannot score us as very good ( 5 Stars) on any question, please feel free to call the office to discuss how we could have made your experience exceptional.     Thank you.     Clinical Care Team:  DO Hien Arnold LPN    Triage:  Michael Prima, [address] [facility] Team:  Elva Archer

## 2024-01-16 ENCOUNTER — APPOINTMENT (OUTPATIENT)
Dept: URBAN - METROPOLITAN AREA CLINIC 204 | Age: 54
Setting detail: DERMATOLOGY
End: 2024-01-17

## 2024-01-16 PROCEDURE — 17273 DSTR MAL LES S/N/H/F/G 2.1-3: CPT

## 2024-01-16 PROCEDURE — OTHER MIPS QUALITY: OTHER

## 2024-01-16 NOTE — PROCEDURE: MIPS QUALITY
Additional Notes: Documentation for MIPS  purposes only. Full Patient visit note from paper chart is available for review and also scanned in EMA chart.
Detail Level: Detailed
Additional Notes: Patient received verbal cessation counseling (3 min or less)
Quality 226: Preventive Care And Screening: Tobacco Use: Screening And Cessation Intervention: Patient screened for tobacco use, is a smoker AND received Cessation Counseling within the Previous 12 Months

## 2024-01-25 ENCOUNTER — TELEPHONE (OUTPATIENT)
Dept: FAMILY MEDICINE CLINIC | Age: 54
End: 2024-01-25

## 2024-01-25 NOTE — TELEPHONE ENCOUNTER
----- Message from Amparo Velez sent at 1/25/2024 10:12 AM EST -----  Subject: Message to Provider    QUESTIONS  Information for Provider? Pt would like Marla to call her back. [facility]   would like to discuss results from dermatologist. [facility] results came back   as cancerous. Was referred to an office in [place]. Would like to see about   seeing someone closer.   ---------------------------------------------------------------------------  --------------  CALL BACK INFO  [phone]; OK to leave message on voicemail  ---------------------------------------------------------------------------  --------------  SCRIPT ANSWERS  Relationship to Patient? Self

## 2024-02-05 ENCOUNTER — PATIENT MESSAGE (OUTPATIENT)
Dept: FAMILY MEDICINE CLINIC | Age: 54
End: 2024-02-05

## 2024-02-05 NOTE — TELEPHONE ENCOUNTER
From: Maricel Adams  To: Dr. Jorge Rodríguez  Sent: 2/5/2024 3:15 PM EST  Subject: FMLA    Hello, the nurse at work told me I need FMLA papers filed out for the cancer surgery and in case I need follow up appointments. She was going to send them to you since I don't know which doctor I should have do it. The dermatologist you sent me to is sending me to a mohs surgeon in [place]. I go tomorrow for surgery. I've already used up all vacation days when I had COVID, I can only miss 5 more days before I am fired. I really need my job. This is why the nurse wants the FMLA papers. It will back date to cover days.   So if it's not you to fill them out let me know who.   Thank you!

## 2024-02-13 ENCOUNTER — OFFICE VISIT (OUTPATIENT)
Dept: FAMILY MEDICINE CLINIC | Age: 54
End: 2024-02-13

## 2024-02-13 VITALS
SYSTOLIC BLOOD PRESSURE: 136 MMHG | DIASTOLIC BLOOD PRESSURE: 80 MMHG | HEART RATE: 85 BPM | WEIGHT: 144 LBS | BODY MASS INDEX: 27.21 KG/M2 | OXYGEN SATURATION: 96 %

## 2024-02-13 DIAGNOSIS — D23.9 DERMATOFIBROMA: ICD-10-CM

## 2024-02-13 DIAGNOSIS — Z48.02 VISIT FOR SUTURE REMOVAL: Primary | ICD-10-CM

## 2024-02-13 NOTE — PROGRESS NOTES
the am and 1 tab in the PM   Yes Rodriguez Leiva MD   latanoprost (XALATAN) 0.005 % ophthalmic solution  2/7/22  Yes Rodriguez Leiva MD   ibuprofen (ADVIL;MOTRIN) 600 MG tablet Take 1 tablet by mouth every 6 hours as needed   Yes Rodriguez Leiva MD   cholestyramine (QUESTRAN) 4 g packet Take 1 packet by mouth daily  Patient not taking: Reported on 2/13/2024 8/21/23   Jorge Rodríguez DO   aspirin 325 MG tablet Take 1 tablet by mouth daily  Patient not taking: Reported on 12/29/2023    Rodriguez Leiva MD   fluticasone (FLONASE) 50 MCG/ACT nasal spray 2 sprays by Nasal route daily 4/1/19 1/5/22  Rodriguez Leiva MD        Allergies:       Adhesive tape, Celebrex [celecoxib], Codeine, Demerol hcl [meperidine], Dexamethasone, Hydrocodone-acetaminophen, Oxycodone-acetaminophen, and Prednisone    Social History:     Tobacco:    reports that she has been smoking cigarettes. She started smoking about 40 years ago. She has a 15.0 pack-year smoking history. She has never used smokeless tobacco.  Alcohol:      reports that she does not currently use alcohol.  Drug Use:  reports no history of drug use.    Family History:     Family History   Problem Relation Age of Onset    Thyroid Disease Mother     High Blood Pressure Mother     Cancer Mother     Cancer Father     Hypotension Sister     Lupus Sister     High Blood Pressure Brother     High Blood Pressure Brother        Review of Systems:         Review of Systems   Constitutional:  Negative for fever.   HENT:  Negative for rhinorrhea, sinus pain and sneezing.    Respiratory:  Negative for cough and wheezing.    Cardiovascular:  Negative for chest pain.   Gastrointestinal:  Negative for abdominal pain, diarrhea, nausea and vomiting.   Musculoskeletal:  Negative for back pain.   Skin:  Negative for rash.        +surgical site as per HPI   Neurological:  Negative for headaches.   Psychiatric/Behavioral:  Negative for sleep disturbance.

## 2024-02-13 NOTE — PATIENT INSTRUCTIONS
SURVEY:    You may be receiving a survey from [facility] regarding your visit today.    You may get this in the mail, through your MyChart or in your email.     Please complete the survey to enable us to provide the highest quality of care to you and your family.    If you cannot score us as very good ( 5 Stars) on any question, please feel free to call the office to discuss how we could have made your experience exceptional.     Thank you.    Clinical Care Team:  Dr. Jorge Rodríguez, DO Monica Small LPN    Triage:  Leticia Kessler CMA    Clerical Team:  Leticia Ramos

## 2024-02-16 ENCOUNTER — OFFICE VISIT (OUTPATIENT)
Dept: FAMILY MEDICINE CLINIC | Age: 54
End: 2024-02-16
Payer: COMMERCIAL

## 2024-02-16 VITALS
WEIGHT: 144 LBS | OXYGEN SATURATION: 96 % | SYSTOLIC BLOOD PRESSURE: 128 MMHG | BODY MASS INDEX: 27.21 KG/M2 | DIASTOLIC BLOOD PRESSURE: 70 MMHG | HEART RATE: 82 BPM

## 2024-02-16 DIAGNOSIS — T14.8XXA SURGICAL WOUND PRESENT: Primary | ICD-10-CM

## 2024-02-16 PROCEDURE — 99213 OFFICE O/P EST LOW 20 MIN: CPT | Performed by: STUDENT IN AN ORGANIZED HEALTH CARE EDUCATION/TRAINING PROGRAM

## 2024-02-16 NOTE — PROGRESS NOTES
HPI Notes    Name: Maricel Adams  : 1970         Chief Complaint:     Chief Complaint   Patient presents with    Follow Up After Procedure     Patient would like to discuss options to manage wound.       History of Present Illness:        HPI    This is a 53-year-old woman presenting for reevaluation of her surgical wound.  She reports that part of the flap has apparently necrosis and necessitated debridement by her Mohs surgeon. She is very anxious about caring for the wound herself. Her surgeon did provide her with instructions for the wound changes; wash daily with soap/water then layer ointment (vaseline) in the wound covering it with a  nonstick pad, paper tape and a bandage. Change daily.     Past Medical History:     Past Medical History:   Diagnosis Date    Asthma     Glaucoma     Closed fior Glaucoma      Reviewed all health maintenance requirements and ordered appropriate tests  Health Maintenance Due   Topic Date Due    Hepatitis B vaccine (1 of 3 - 3-dose series) Never done    COVID-19 Vaccine (1) Never done    Breast cancer screen  Never done    Shingles vaccine (1 of 2) Never done    Flu vaccine (1) 2023       Past Surgical History:     Past Surgical History:   Procedure Laterality Date    CARPAL TUNNEL RELEASE      CHOLECYSTECTOMY      EYE SURGERY      HYSTERECTOMY (CERVIX STATUS UNKNOWN)      SHOULDER SURGERY      x 3, left    TUBAL LIGATION          Medications:       Prior to Admission medications    Medication Sig Start Date End Date Taking? Authorizing Provider   Magnesium Cl-Calcium Carbonate (SLOW-MAG PO) Take by mouth Takes 1 tab in the am and 1 tab in the PM   Yes ProviderRodriguez MD   latanoprost (XALATAN) 0.005 % ophthalmic solution  22  Yes ProviderRodriguez MD   ibuprofen (ADVIL;MOTRIN) 600 MG tablet Take 1 tablet by mouth every 6 hours as needed   Yes ProviderRodriguez MD   cholestyramine (QUESTRAN) 4 g packet Take 1 packet by mouth daily  Patient not

## 2024-02-19 ENCOUNTER — OFFICE VISIT (OUTPATIENT)
Dept: FAMILY MEDICINE CLINIC | Age: 54
End: 2024-02-19
Payer: COMMERCIAL

## 2024-02-19 VITALS
BODY MASS INDEX: 27.4 KG/M2 | OXYGEN SATURATION: 97 % | DIASTOLIC BLOOD PRESSURE: 70 MMHG | SYSTOLIC BLOOD PRESSURE: 132 MMHG | HEART RATE: 71 BPM | WEIGHT: 145 LBS

## 2024-02-19 DIAGNOSIS — T14.8XXA SURGICAL WOUND PRESENT: Primary | ICD-10-CM

## 2024-02-19 PROCEDURE — 99213 OFFICE O/P EST LOW 20 MIN: CPT | Performed by: STUDENT IN AN ORGANIZED HEALTH CARE EDUCATION/TRAINING PROGRAM

## 2024-02-19 ASSESSMENT — ENCOUNTER SYMPTOMS
SINUS PAIN: 0
NAUSEA: 0
VOMITING: 0
WHEEZING: 0
COUGH: 0
RHINORRHEA: 0
DIARRHEA: 0
ABDOMINAL PAIN: 0
BACK PAIN: 0

## 2024-02-19 NOTE — PATIENT INSTRUCTIONS
SURVEY:    You may be receiving a survey from [facility] regarding your visit today.    You may get this in the mail, through your MyChart or in your email.     Please complete the survey to enable us to provide the highest quality of care to you and your family.    If you cannot score us as very good ( 5 Stars) on any question, please feel free to call the office to discuss how we could have made your experience exceptional.     Thank you.    Clinical Care Team:  Dr. Jorge Rodríguez, DO Monica Small LPN    Triage:  Leticia Kessler CMA    Clerical Team:  Leticia Ramos

## 2024-02-19 NOTE — PROGRESS NOTES
Provider, MD Rodriguez   fluticasone (FLONASE) 50 MCG/ACT nasal spray 2 sprays by Nasal route daily 4/1/19 1/5/22  Provider, MD Rodriguez        Allergies:       Adhesive tape, Celebrex [celecoxib], Codeine, Demerol hcl [meperidine], Dexamethasone, Hydrocodone-acetaminophen, Oxycodone-acetaminophen, and Prednisone    Social History:     Tobacco:    reports that she has been smoking cigarettes. She started smoking about 40 years ago. She has a 15.0 pack-year smoking history. She has never used smokeless tobacco.  Alcohol:      reports that she does not currently use alcohol.  Drug Use:  reports no history of drug use.    Family History:     Family History   Problem Relation Age of Onset    Thyroid Disease Mother     High Blood Pressure Mother     Cancer Mother     Cancer Father     Hypotension Sister     Lupus Sister     High Blood Pressure Brother     High Blood Pressure Brother        Review of Systems:     Review of Systems   Constitutional:  Negative for fever.   HENT:  Negative for rhinorrhea, sinus pain and sneezing.    Respiratory:  Negative for cough and wheezing.    Cardiovascular:  Negative for chest pain.   Gastrointestinal:  Negative for abdominal pain, diarrhea, nausea and vomiting.   Musculoskeletal:  Negative for back pain.   Skin:  Positive for wound. Negative for rash.   Neurological:  Negative for headaches.   Psychiatric/Behavioral:  Negative for sleep disturbance.      Physical Exam:     Vitals:  /70   Pulse 71   Wt 65.8 kg (145 lb)   SpO2 97%   BMI 27.40 kg/m²     Physical Exam  Vitals and nursing note reviewed.   Constitutional:       General: She is not in acute distress.     Appearance: Normal appearance. She is normal weight.   Skin:     General: Skin is warm and dry.      Capillary Refill: Capillary refill takes less than 2 seconds.      Comments: Wound per HPI; 3 cm x 3 cm with granulation tissue, small yellow eschar   Neurological:      General: No focal deficit present.

## 2024-02-26 ENCOUNTER — TELEPHONE (OUTPATIENT)
Dept: FAMILY MEDICINE CLINIC | Age: 54
End: 2024-02-26

## 2024-02-26 ENCOUNTER — APPOINTMENT (OUTPATIENT)
Dept: URBAN - METROPOLITAN AREA CLINIC 204 | Age: 54
Setting detail: DERMATOLOGY
End: 2024-02-27

## 2024-02-26 DIAGNOSIS — T14.8XXA SURGICAL WOUND PRESENT: Primary | ICD-10-CM

## 2024-02-26 PROCEDURE — 17000 DESTRUCT PREMALG LESION: CPT

## 2024-02-26 PROCEDURE — 99213 OFFICE O/P EST LOW 20 MIN: CPT | Mod: 25

## 2024-02-26 PROCEDURE — 17003 DESTRUCT PREMALG LES 2-14: CPT

## 2024-02-26 PROCEDURE — OTHER MIPS QUALITY: OTHER

## 2024-02-26 NOTE — PROCEDURE: MIPS QUALITY
Additional Notes: Documentation for MIPS  purposes only. Full Patient visit note from paper chart is available for review and also scanned in EMA chart.
Detail Level: Detailed
Quality 47: Advance Care Plan: Advance Care Planning discussed and documented in the medical record; patient did not wish or was not able to name a surrogate decision maker or provide an advance care plan.
Quality 226: Preventive Care And Screening: Tobacco Use: Screening And Cessation Intervention: Patient screened for tobacco use, is a smoker AND received Cessation Counseling within measurement period or in the six months prior to the measurement period

## 2024-02-26 NOTE — TELEPHONE ENCOUNTER
Patient can get in to [facility] this Thursday at 915. Can a referral just be put in for her?    Phone [phone]  Fax [phone]

## 2024-02-26 NOTE — TELEPHONE ENCOUNTER
Maricel was referred to [place] wound clinic for dressing change. They refused there referral stating they only seeing patients with conditions below the knee. Where else should she be referred to?      Health Maintenance   Topic Date Due    Hepatitis B vaccine (1 of 3 - 3-dose series) Never done    COVID-19 Vaccine (1) Never done    Breast cancer screen  Never done    Shingles vaccine (1 of 2) Never done    Flu vaccine (1) 08/01/2023    A1C test (Diabetic or Prediabetic)  08/21/2024    Depression Screen  02/12/2025    Colorectal Cancer Screen  08/31/2026    Lipids  08/21/2028    DTaP/Tdap/Td vaccine (3 - Td or Tdap) 01/20/2031    Pneumococcal 0-64 years Vaccine  Completed    Hepatitis A vaccine  Aged Out    Hib vaccine  Aged Out    Polio vaccine  Aged Out    Meningococcal (ACWY) vaccine  Aged Out    Diabetes screen  Discontinued    Hepatitis C screen  Discontinued    HIV screen  Discontinued             (applicable per patient's age: Cancer Screenings, Depression Screening, Fall Risk Screening, Immunizations)    Hemoglobin A1C (%)   Date Value   08/21/2023 5.8   02/10/2022 5.7     LDL Cholesterol (mg/dL)   Date Value   08/21/2023 126     AST (U/L)   Date Value   08/21/2023 17     ALT (U/L)   Date Value   08/21/2023 17     BUN (mg/dL)   Date Value   08/21/2023 11      (goal A1C is < 7)   (goal LDL is <100) need 30-50% reduction from baseline     BP Readings from Last 3 Encounters:   02/19/24 132/70   02/16/24 128/70   02/13/24 136/80    (goal /80)      All Future Testing planned in [facility]:  Lab Frequency Next Occurrence   Basic Metabolic Panel Once 03/08/2023   DARLINE GRAZYNA DIGITAL SCREEN BILATERAL Once 08/21/2023   Full PFT Study With Bronchodilator Once 08/21/2023       Next Visit Date:  Future Appointments   Date Time Provider Department Center   3/20/2024  8:00 AM Jorge Rodríguez DO [facility]            Patient Active Problem List:     Nicotine dependence, cigarettes, uncomplicated     Acute

## 2024-03-04 ENCOUNTER — OFFICE VISIT (OUTPATIENT)
Dept: FAMILY MEDICINE CLINIC | Age: 54
End: 2024-03-04
Payer: COMMERCIAL

## 2024-03-04 VITALS
OXYGEN SATURATION: 98 % | HEART RATE: 80 BPM | SYSTOLIC BLOOD PRESSURE: 136 MMHG | WEIGHT: 145 LBS | DIASTOLIC BLOOD PRESSURE: 80 MMHG | BODY MASS INDEX: 27.4 KG/M2

## 2024-03-04 DIAGNOSIS — Z98.890 HISTORY OF MOHS MICROGRAPHIC SURGERY FOR SKIN CANCER: ICD-10-CM

## 2024-03-04 DIAGNOSIS — T14.8XXA SURGICAL WOUND PRESENT: ICD-10-CM

## 2024-03-04 DIAGNOSIS — Z85.828 HISTORY OF MOHS MICROGRAPHIC SURGERY FOR SKIN CANCER: ICD-10-CM

## 2024-03-04 DIAGNOSIS — I89.0 LYMPHEDEMA: Primary | ICD-10-CM

## 2024-03-04 PROCEDURE — 99213 OFFICE O/P EST LOW 20 MIN: CPT | Performed by: STUDENT IN AN ORGANIZED HEALTH CARE EDUCATION/TRAINING PROGRAM

## 2024-03-04 ASSESSMENT — ENCOUNTER SYMPTOMS
DIARRHEA: 0
NAUSEA: 0
RHINORRHEA: 0
COUGH: 0
BACK PAIN: 0
VOMITING: 0
SINUS PAIN: 0
ABDOMINAL PAIN: 0
WHEEZING: 0

## 2024-03-04 NOTE — PROGRESS NOTES
Pulse 80   Wt 65.8 kg (145 lb)   SpO2 98%   BMI 27.40 kg/m²       Physical Exam  Vitals and nursing note reviewed.   Constitutional:       General: She is not in acute distress.     Appearance: Normal appearance. She is normal weight.   Skin:     General: Skin is warm and dry.      Capillary Refill: Capillary refill takes less than 2 seconds.      Comments: Left neck, cheek lymphedema (mild). Surgical wound covered, no surrounding erythema   Neurological:      General: No focal deficit present.      Mental Status: She is alert and oriented to person, place, and time. Mental status is at baseline.      Cranial Nerves: No cranial nerve deficit.   Psychiatric:         Mood and Affect: Mood normal.         Behavior: Behavior normal.         Thought Content: Thought content normal.                 Data:     Lab Results   Component Value Date/Time     08/21/2023 10:16 AM    K 4.2 08/21/2023 10:16 AM     08/21/2023 10:16 AM    CO2 25 08/21/2023 10:16 AM    BUN 11 08/21/2023 10:16 AM    CREATININE 0.7 08/21/2023 10:16 AM    GLUCOSE 100 08/21/2023 10:16 AM    PROT 6.9 08/21/2023 10:16 AM    LABALBU 3.9 08/21/2023 10:16 AM    BILITOT 0.4 08/21/2023 10:16 AM    ALKPHOS 120 08/21/2023 10:16 AM    AST 17 08/21/2023 10:16 AM    ALT 17 08/21/2023 10:16 AM     Lab Results   Component Value Date/Time    WBC 7.0 08/21/2023 10:16 AM    RBC 4.88 08/21/2023 10:16 AM    HGB 14.6 08/21/2023 10:16 AM    HCT 43.4 08/21/2023 10:16 AM    MCV 89.0 08/21/2023 10:16 AM    MCH 29.9 08/21/2023 10:16 AM    MCHC 33.6 08/21/2023 10:16 AM    RDW 14.8 08/21/2023 10:16 AM     08/21/2023 10:16 AM    MPV NOT REPORTED 01/24/2018 06:15 PM     No results found for: \"TSH\"  Lab Results   Component Value Date/Time    CHOL 187 08/21/2023 10:16 AM    HDL 34 08/21/2023 10:16 AM    LABA1C 5.8 08/21/2023 09:15 AM          Assessment & Plan        Diagnosis Orders   1. Lymphedema        2. Surgical wound present        3. History of Mohs

## 2024-03-04 NOTE — PATIENT INSTRUCTIONS
SURVEY:    You may be receiving a survey from [facility] regarding your visit today.    You may get this in the mail, through your MyChart or in your email.     Please complete the survey to enable us to provide the highest quality of care to you and your family.    If you cannot score us as very good ( 5 Stars) on any question, please feel free to call the office to discuss how we could have made your experience exceptional.     Thank you.    Clinical Care Team:  Dr. Jorge Rodríguez, DO Monica Small LPN    Triage:  Leticia Kesslre CMA    Clerical Team:  Leticia Ramos

## 2024-03-06 DIAGNOSIS — Z85.828 HISTORY OF MOHS MICROGRAPHIC SURGERY FOR SKIN CANCER: Primary | ICD-10-CM

## 2024-03-06 DIAGNOSIS — Z98.890 HISTORY OF MOHS MICROGRAPHIC SURGERY FOR SKIN CANCER: Primary | ICD-10-CM

## 2024-03-20 ENCOUNTER — OFFICE VISIT (OUTPATIENT)
Dept: FAMILY MEDICINE CLINIC | Age: 54
End: 2024-03-20
Payer: COMMERCIAL

## 2024-03-20 VITALS
SYSTOLIC BLOOD PRESSURE: 122 MMHG | OXYGEN SATURATION: 95 % | DIASTOLIC BLOOD PRESSURE: 72 MMHG | HEART RATE: 78 BPM | WEIGHT: 145 LBS | BODY MASS INDEX: 27.4 KG/M2

## 2024-03-20 DIAGNOSIS — Z85.828 HISTORY OF MOHS MICROGRAPHIC SURGERY FOR SKIN CANCER: ICD-10-CM

## 2024-03-20 DIAGNOSIS — T14.8XXA SURGICAL WOUND PRESENT: Primary | ICD-10-CM

## 2024-03-20 DIAGNOSIS — R49.0 HOARSE VOICE QUALITY: ICD-10-CM

## 2024-03-20 DIAGNOSIS — Z98.890 HISTORY OF MOHS MICROGRAPHIC SURGERY FOR SKIN CANCER: ICD-10-CM

## 2024-03-20 PROCEDURE — 99213 OFFICE O/P EST LOW 20 MIN: CPT | Performed by: STUDENT IN AN ORGANIZED HEALTH CARE EDUCATION/TRAINING PROGRAM

## 2024-03-20 ASSESSMENT — ENCOUNTER SYMPTOMS
VOICE CHANGE: 1
ABDOMINAL PAIN: 0
DIARRHEA: 0
COUGH: 0
BACK PAIN: 0
VOMITING: 0
RHINORRHEA: 0
SINUS PAIN: 0
NAUSEA: 0

## 2024-03-20 NOTE — PATIENT INSTRUCTIONS
SURVEY:    You may be receiving a survey from [facility] regarding your visit today.    You may get this in the mail, through your MyChart or in your email.     Please complete the survey to enable us to provide the highest quality of care to you and your family.    If you cannot score us as very good ( 5 Stars) on any question, please feel free to call the office to discuss how we could have made your experience exceptional.     Thank you.    Clinical Care Team:  Dr. Jorge Rodríguez, DO Monica Small LPN    Triage:  Leticia Kessler CMA    Clerical Team:  Leticia Ramos

## 2024-03-20 NOTE — PROGRESS NOTES
HPI Notes    Name: Maricel Adams  : 1970         Chief Complaint:     Chief Complaint   Patient presents with    Wound Check     4 week follow up. Patient has noticed in the past week the swelling from the lymphedema is causing her to lose her voice. When she lays down while applying pressure to the left side of her face her throat clears back up. Then her voice will start to return. She does still have some drainage and pain from of the wound.        History of Present Illness:      HPI    This is a 53 year old woman presenting for reevaluation of a left temporal surgical wound s/p Mohs surgery. I have been reviewing notes from the wound clinic. There are no fevers, chills, bleeding, discharge from the wound. However, she has noted progressive hoarseness throughout the day which resolves when lying on her left side or putting pressure on the left cheek/neck. After several hours her voice becomes progressively hoarse \"until I lose my voice\". During this time, she notes the left side of her neck and angle of the mandible to have a sensation of pressure.     Past Medical History:     Past Medical History:   Diagnosis Date    Asthma     Glaucoma     Closed fior Glaucoma      Reviewed all health maintenance requirements and ordered appropriate tests  Health Maintenance Due   Topic Date Due    Hepatitis B vaccine (1 of 3 - 3-dose series) Never done    COVID-19 Vaccine (1) Never done    Breast cancer screen  Never done    Shingles vaccine (1 of 2) Never done    Flu vaccine (1) 2023       Past Surgical History:     Past Surgical History:   Procedure Laterality Date    CARPAL TUNNEL RELEASE      CHOLECYSTECTOMY      EYE SURGERY      HYSTERECTOMY (CERVIX STATUS UNKNOWN)      SHOULDER SURGERY      x 3, left    TUBAL LIGATION          Medications:       Prior to Admission medications    Medication Sig Start Date End Date Taking? Authorizing Provider   aspirin 325 MG tablet Take 1 tablet by mouth daily   Yes

## 2024-04-26 ENCOUNTER — OFFICE VISIT (OUTPATIENT)
Dept: FAMILY MEDICINE CLINIC | Age: 54
End: 2024-04-26
Payer: COMMERCIAL

## 2024-04-26 VITALS
BODY MASS INDEX: 27.4 KG/M2 | SYSTOLIC BLOOD PRESSURE: 112 MMHG | HEART RATE: 80 BPM | OXYGEN SATURATION: 99 % | DIASTOLIC BLOOD PRESSURE: 70 MMHG | WEIGHT: 145 LBS

## 2024-04-26 DIAGNOSIS — R51.9 SCALP PAIN: Primary | ICD-10-CM

## 2024-04-26 PROCEDURE — 99213 OFFICE O/P EST LOW 20 MIN: CPT | Performed by: STUDENT IN AN ORGANIZED HEALTH CARE EDUCATION/TRAINING PROGRAM

## 2024-04-26 RX ORDER — GABAPENTIN 100 MG/1
100 CAPSULE ORAL NIGHTLY
Qty: 14 CAPSULE | Refills: 0 | Status: SHIPPED | OUTPATIENT
Start: 2024-04-26 | End: 2024-05-10

## 2024-04-26 ASSESSMENT — ENCOUNTER SYMPTOMS
NAUSEA: 0
VOMITING: 0
RHINORRHEA: 0
COUGH: 0
BACK PAIN: 0
WHEEZING: 0
SINUS PAIN: 0
DIARRHEA: 0
ABDOMINAL PAIN: 0

## 2024-04-26 NOTE — PATIENT INSTRUCTIONS
SURVEY:    You may be receiving a survey from [facility] regarding your visit today.    You may get this in the mail, through your MyChart or in your email.     Please complete the survey to enable us to provide the highest quality of care to you and your family.    If you cannot score us as very good ( 5 Stars) on any question, please feel free to call the office to discuss how we could have made your experience exceptional.     Thank you.    Clinical Care Team:  Dr. Jorge Rodríguez, DO Monica Small LPN    Triage:  Leticia Kessler CMA    Clerical Team:  Leticia Ramos

## 2024-05-10 ENCOUNTER — TELEPHONE (OUTPATIENT)
Dept: FAMILY MEDICINE CLINIC | Age: 54
End: 2024-05-10

## 2024-05-10 NOTE — TELEPHONE ENCOUNTER
----- Message from Edel Aly sent at 5/10/2024  8:39 AM EDT -----  Subject: Message to Provider    QUESTIONS  Information for Provider? Patient FMLA papers need to be corrected her HR   states the End date needs to be 1 year from start date   ---------------------------------------------------------------------------  --------------  CALL BACK INFO  [phone]; OK to leave message on voicemail,OK to respond with electronic   message via [employer] portal (only for patients who have registered [employer]   account)  ---------------------------------------------------------------------------  --------------  SCRIPT ANSWERS  Relationship to Patient? Self

## 2024-05-16 ENCOUNTER — OFFICE VISIT (OUTPATIENT)
Dept: FAMILY MEDICINE CLINIC | Age: 54
End: 2024-05-16
Payer: COMMERCIAL

## 2024-05-16 ENCOUNTER — HOSPITAL ENCOUNTER (OUTPATIENT)
Age: 54
Discharge: HOME | End: 2024-05-16
Payer: COMMERCIAL

## 2024-05-16 VITALS
BODY MASS INDEX: 27.4 KG/M2 | HEART RATE: 79 BPM | OXYGEN SATURATION: 96 % | DIASTOLIC BLOOD PRESSURE: 78 MMHG | SYSTOLIC BLOOD PRESSURE: 130 MMHG | WEIGHT: 145 LBS

## 2024-05-16 DIAGNOSIS — E55.9 VITAMIN D DEFICIENCY: ICD-10-CM

## 2024-05-16 DIAGNOSIS — Z00.00 GENERAL MEDICAL EXAM: Primary | ICD-10-CM

## 2024-05-16 DIAGNOSIS — Z13.220 SCREENING FOR HYPERLIPIDEMIA: ICD-10-CM

## 2024-05-16 DIAGNOSIS — Z13.1 SCREENING FOR DIABETES MELLITUS: ICD-10-CM

## 2024-05-16 DIAGNOSIS — Z00.00 GENERAL MEDICAL EXAM: ICD-10-CM

## 2024-05-16 DIAGNOSIS — Z85.828 HISTORY OF MOHS MICROGRAPHIC SURGERY FOR SKIN CANCER: ICD-10-CM

## 2024-05-16 DIAGNOSIS — Z98.890 HISTORY OF MOHS MICROGRAPHIC SURGERY FOR SKIN CANCER: ICD-10-CM

## 2024-05-16 LAB
25(OH)D3 SERPL-MCNC: 13.3 NG/ML (ref 30–100)
ALBUMIN SERPL-MCNC: 4 G/DL (ref 3.5–5.2)
ALP SERPL-CCNC: 124 U/L (ref 35–104)
ALT SERPL-CCNC: 16 U/L (ref 5–33)
ANION GAP SERPL CALCULATED.3IONS-SCNC: 11 MMOL/L (ref 9–17)
AST SERPL-CCNC: 15 U/L
BASOPHILS # BLD: 0.01 K/UL (ref 0–0.2)
BASOPHILS NFR BLD: 0 % (ref 0–2)
BILIRUB SERPL-MCNC: 0.4 MG/DL (ref 0.3–1.2)
BUN SERPL-MCNC: 11 MG/DL (ref 6–20)
BUN/CREAT SERPL: 16 (ref 9–20)
CALCIUM SERPL-MCNC: 8.9 MG/DL (ref 8.6–10.4)
CHLORIDE SERPL-SCNC: 106 MMOL/L (ref 98–107)
CHOLEST SERPL-MCNC: 194 MG/DL (ref 0–199)
CHOLESTEROL/HDL RATIO: 6
CO2 SERPL-SCNC: 22 MMOL/L (ref 20–31)
CREAT SERPL-MCNC: 0.7 MG/DL (ref 0.5–0.9)
EOSINOPHIL # BLD: 0.35 K/UL (ref 0–0.4)
EOSINOPHILS RELATIVE PERCENT: 4 % (ref 0–5)
ERYTHROCYTE [DISTWIDTH] IN BLOOD BY AUTOMATED COUNT: 13.4 % (ref 12.1–15.2)
GFR, ESTIMATED: >90 ML/MIN/1.73M2
GLUCOSE SERPL-MCNC: 99 MG/DL (ref 70–99)
HCT VFR BLD AUTO: 43.8 % (ref 36–46)
HDLC SERPL-MCNC: 35 MG/DL
HGB BLD-MCNC: 14.9 G/DL (ref 12–16)
IMM GRANULOCYTES # BLD AUTO: 0.02 K/UL (ref 0–0.3)
IMM GRANULOCYTES NFR BLD: 0 % (ref 0–5)
LDLC SERPL CALC-MCNC: 140 MG/DL (ref 0–100)
LYMPHOCYTES NFR BLD: 2.47 K/UL (ref 1–4.8)
LYMPHOCYTES RELATIVE PERCENT: 31 % (ref 15–40)
MCH RBC QN AUTO: 30.2 PG (ref 26–34)
MCHC RBC AUTO-ENTMCNC: 34 G/DL (ref 31–37)
MCV RBC AUTO: 88.7 FL (ref 80–100)
MONOCYTES NFR BLD: 0.35 K/UL (ref 0–1)
MONOCYTES NFR BLD: 4 % (ref 4–8)
NEUTROPHILS NFR BLD: 61 % (ref 47–75)
NEUTS SEG NFR BLD: 4.88 K/UL (ref 2.5–7)
PLATELET # BLD AUTO: 281 K/UL (ref 140–450)
PMV BLD AUTO: 10 FL (ref 6–12)
POTASSIUM SERPL-SCNC: 4.2 MMOL/L (ref 3.7–5.3)
PROT SERPL-MCNC: 6.8 G/DL (ref 6.4–8.3)
RBC # BLD AUTO: 4.94 M/UL (ref 4–5.2)
SODIUM SERPL-SCNC: 139 MMOL/L (ref 135–144)
TRIGL SERPL-MCNC: 94 MG/DL
VLDLC SERPL CALC-MCNC: 19 MG/DL
WBC OTHER # BLD: 8.1 K/UL (ref 3.5–11)

## 2024-05-16 PROCEDURE — 80053 COMPREHEN METABOLIC PANEL: CPT

## 2024-05-16 PROCEDURE — 99396 PREV VISIT EST AGE 40-64: CPT | Performed by: STUDENT IN AN ORGANIZED HEALTH CARE EDUCATION/TRAINING PROGRAM

## 2024-05-16 PROCEDURE — 82306 VITAMIN D 25 HYDROXY: CPT

## 2024-05-16 PROCEDURE — 85025 COMPLETE CBC W/AUTO DIFF WBC: CPT

## 2024-05-16 PROCEDURE — 36415 COLL VENOUS BLD VENIPUNCTURE: CPT

## 2024-05-16 PROCEDURE — 80061 LIPID PANEL: CPT

## 2024-05-16 ASSESSMENT — ENCOUNTER SYMPTOMS
BACK PAIN: 0
SINUS PAIN: 0
RHINORRHEA: 0
NAUSEA: 0
ABDOMINAL PAIN: 0
WHEEZING: 0
COUGH: 0
VOMITING: 0

## 2024-05-16 NOTE — PROGRESS NOTES
HPI Notes    Name: Maricel Adams  : 1970         Chief Complaint:     Chief Complaint   Patient presents with    Annual Exam       History of Present Illness:        HPI    This is a 53-year-old woman with medical history significant for Mohs surgery due to skin cancer, well-healed, as well as vitamin D deficiency presenting for an general physical exam.  She is also needing biometric screening for her employer, which she does bring.  This consists of fasting blood glucose and lipid profile.  She is fasting.  She overall has no concerns or questions today and notes that her scalp pain and axillary pain have completely resolved.  She feels well and is eager to return to her typical routine.    Past Medical History:     Past Medical History:   Diagnosis Date    Asthma     Glaucoma     Closed fior Glaucoma      Reviewed all health maintenance requirements and ordered appropriate tests  Health Maintenance Due   Topic Date Due    Hepatitis B vaccine (1 of 3 - 3-dose series) Never done    COVID-19 Vaccine (1) Never done    Breast cancer screen  Never done    Shingles vaccine (1 of 2) Never done       Past Surgical History:     Past Surgical History:   Procedure Laterality Date    CARPAL TUNNEL RELEASE      CHOLECYSTECTOMY      EYE SURGERY      HYSTERECTOMY (CERVIX STATUS UNKNOWN)      SHOULDER SURGERY      x 3, left    TUBAL LIGATION          Medications:       Prior to Admission medications    Medication Sig Start Date End Date Taking? Authorizing Provider   cholestyramine (QUESTRAN) 4 g packet Take 1 packet by mouth daily 23  Yes Jorge Rodríguez DO   aspirin 325 MG tablet Take 1 tablet by mouth daily   Yes ProviderRodriguez MD   Magnesium Cl-Calcium Carbonate (SLOW-MAG PO) Take by mouth Takes 1 tab in the am and 1 tab in the PM   Yes ProviderRodriguez MD   latanoprost (XALATAN) 0.005 % ophthalmic solution  22  Yes ProviderRodriguez MD   ibuprofen (ADVIL;MOTRIN) 600 MG tablet Take 1

## 2024-05-16 NOTE — PATIENT INSTRUCTIONS
SURVEY:    You may be receiving a survey from [facility] regarding your visit today.    You may get this in the mail, through your MyChart or in your email.     Please complete the survey to enable us to provide the highest quality of care to you and your family.    If you cannot score us as very good ( 5 Stars) on any question, please feel free to call the office to discuss how we could have made your experience exceptional.     Thank you.    Clinical Care Team:  Dr. Jorge Rodríguez, DO Monica Small LPN    Triage:  Leticia Kessler CMA    Clerical Team:  Leticia Ramos

## 2024-05-17 DIAGNOSIS — E55.9 VITAMIN D DEFICIENCY: Primary | ICD-10-CM

## 2024-05-17 RX ORDER — ERGOCALCIFEROL 1.25 MG/1
50000 CAPSULE ORAL WEEKLY
Qty: 4 CAPSULE | Refills: 0 | Status: SHIPPED | OUTPATIENT
Start: 2024-05-17

## 2024-05-28 ENCOUNTER — HOSPITAL ENCOUNTER (EMERGENCY)
Age: 54
Discharge: HOME | End: 2024-05-28
Attending: EMERGENCY MEDICINE
Payer: COMMERCIAL

## 2024-05-28 ENCOUNTER — APPOINTMENT (OUTPATIENT)
Dept: GENERAL RADIOLOGY | Age: 54
End: 2024-05-28
Payer: COMMERCIAL

## 2024-05-28 VITALS
HEART RATE: 76 BPM | DIASTOLIC BLOOD PRESSURE: 87 MMHG | TEMPERATURE: 98.6 F | WEIGHT: 145 LBS | OXYGEN SATURATION: 99 % | HEIGHT: 61 IN | RESPIRATION RATE: 18 BRPM | SYSTOLIC BLOOD PRESSURE: 142 MMHG | BODY MASS INDEX: 27.38 KG/M2

## 2024-05-28 DIAGNOSIS — M79.642 PAIN OF LEFT HAND: Primary | ICD-10-CM

## 2024-05-28 PROCEDURE — 73130 X-RAY EXAM OF HAND: CPT

## 2024-05-28 PROCEDURE — 99283 EMERGENCY DEPT VISIT LOW MDM: CPT

## 2024-05-28 ASSESSMENT — PAIN DESCRIPTION - LOCATION: LOCATION: HAND

## 2024-05-28 ASSESSMENT — PAIN DESCRIPTION - DESCRIPTORS: DESCRIPTORS: DULL;ACHING

## 2024-05-28 ASSESSMENT — PAIN - FUNCTIONAL ASSESSMENT: PAIN_FUNCTIONAL_ASSESSMENT: 0-10

## 2024-05-28 ASSESSMENT — LIFESTYLE VARIABLES
HOW OFTEN DO YOU HAVE A DRINK CONTAINING ALCOHOL: NEVER
HOW MANY STANDARD DRINKS CONTAINING ALCOHOL DO YOU HAVE ON A TYPICAL DAY: PATIENT DOES NOT DRINK

## 2024-05-28 ASSESSMENT — PAIN DESCRIPTION - FREQUENCY: FREQUENCY: CONTINUOUS

## 2024-05-28 ASSESSMENT — PAIN DESCRIPTION - ORIENTATION: ORIENTATION: LEFT

## 2024-05-28 ASSESSMENT — PAIN DESCRIPTION - PAIN TYPE: TYPE: ACUTE PAIN

## 2024-05-28 ASSESSMENT — PAIN SCALES - GENERAL: PAINLEVEL_OUTOF10: 5

## 2024-05-28 NOTE — ED PROVIDER NOTES
was considered in this case: No     Social determinants of health impacting treatment or disposition: N/A     Shared Decision Making: N/A     Code Status Discussion: N/A          Summation      Patient Course:     ED Medications administered this visit:  Medications - No data to display    New Prescriptions from this visit:    Discharge Medication List as of 5/28/2024  9:50 AM          Follow-up:  Jorge Rodríguez DO  [address] [facility] [address]  [phone]    Schedule an appointment as soon as possible for a visit   As needed, If symptoms worsen        Final Impression:   1. Pain of left hand               (Please note that portions of this note were completed with a voice recognition program.  Efforts were made to edit the dictations but occasionally words are mis-transcribed.)          Ever Junior,   05/28/24 0957

## 2024-05-28 NOTE — DISCHARGE INSTRUCTIONS
Continue to ice and elevate.  Continue ibuprofen or Aleve along with Tylenol as needed for pain.  Follow-up family doctor as needed.

## 2024-06-12 DIAGNOSIS — E55.9 VITAMIN D DEFICIENCY: ICD-10-CM

## 2024-06-12 RX ORDER — ERGOCALCIFEROL 1.25 MG/1
50000 CAPSULE ORAL WEEKLY
Qty: 4 CAPSULE | Refills: 0 | OUTPATIENT
Start: 2024-06-12

## 2024-06-17 ENCOUNTER — HOSPITAL ENCOUNTER (OUTPATIENT)
Age: 54
Discharge: HOME | End: 2024-06-17
Payer: COMMERCIAL

## 2024-06-17 DIAGNOSIS — E55.9 VITAMIN D DEFICIENCY: ICD-10-CM

## 2024-06-17 LAB — 25(OH)D3 SERPL-MCNC: 24 NG/ML (ref 30–100)

## 2024-06-17 PROCEDURE — 36415 COLL VENOUS BLD VENIPUNCTURE: CPT

## 2024-06-17 PROCEDURE — 82306 VITAMIN D 25 HYDROXY: CPT

## 2024-06-18 ENCOUNTER — PATIENT MESSAGE (OUTPATIENT)
Dept: FAMILY MEDICINE CLINIC | Age: 54
End: 2024-06-18

## 2024-06-18 ENCOUNTER — HOSPITAL ENCOUNTER (OUTPATIENT)
Age: 54
Discharge: HOME | End: 2024-06-20
Payer: COMMERCIAL

## 2024-06-18 ENCOUNTER — OFFICE VISIT (OUTPATIENT)
Dept: FAMILY MEDICINE CLINIC | Age: 54
End: 2024-06-18
Payer: COMMERCIAL

## 2024-06-18 ENCOUNTER — HOSPITAL ENCOUNTER (OUTPATIENT)
Age: 54
Discharge: HOME | End: 2024-06-18
Payer: COMMERCIAL

## 2024-06-18 ENCOUNTER — HOSPITAL ENCOUNTER (OUTPATIENT)
Dept: GENERAL RADIOLOGY | Age: 54
Discharge: HOME | End: 2024-06-20
Payer: COMMERCIAL

## 2024-06-18 VITALS
DIASTOLIC BLOOD PRESSURE: 70 MMHG | WEIGHT: 144 LBS | SYSTOLIC BLOOD PRESSURE: 110 MMHG | BODY MASS INDEX: 27.21 KG/M2 | HEART RATE: 83 BPM | OXYGEN SATURATION: 96 %

## 2024-06-18 DIAGNOSIS — M25.572 ACUTE LEFT ANKLE PAIN: ICD-10-CM

## 2024-06-18 DIAGNOSIS — R20.3 HYPERESTHESIA: ICD-10-CM

## 2024-06-18 DIAGNOSIS — E55.9 VITAMIN D DEFICIENCY: ICD-10-CM

## 2024-06-18 DIAGNOSIS — M25.572 ACUTE LEFT ANKLE PAIN: Primary | ICD-10-CM

## 2024-06-18 LAB
25(OH)D3 SERPL-MCNC: 23.2 NG/ML (ref 30–100)
BASOPHILS # BLD: 0.01 K/UL (ref 0–0.2)
BASOPHILS NFR BLD: 0 % (ref 0–2)
CRP SERPL HS-MCNC: 5.8 MG/L (ref 0–5)
EOSINOPHIL # BLD: 0.49 K/UL (ref 0–0.4)
EOSINOPHILS RELATIVE PERCENT: 5 % (ref 0–5)
ERYTHROCYTE [DISTWIDTH] IN BLOOD BY AUTOMATED COUNT: 13.3 % (ref 12.1–15.2)
ERYTHROCYTE [SEDIMENTATION RATE] IN BLOOD BY PHOTOMETRIC METHOD: 6 MM/HR (ref 0–30)
HCT VFR BLD AUTO: 44.9 % (ref 36–46)
HGB BLD-MCNC: 15.1 G/DL (ref 12–16)
IMM GRANULOCYTES # BLD AUTO: 0.05 K/UL (ref 0–0.3)
IMM GRANULOCYTES NFR BLD: 1 % (ref 0–5)
LYMPHOCYTES NFR BLD: 2.62 K/UL (ref 1–4.8)
LYMPHOCYTES RELATIVE PERCENT: 26 % (ref 15–40)
MCH RBC QN AUTO: 29.9 PG (ref 26–34)
MCHC RBC AUTO-ENTMCNC: 33.6 G/DL (ref 31–37)
MCV RBC AUTO: 88.9 FL (ref 80–100)
MONOCYTES NFR BLD: 0.54 K/UL (ref 0–1)
MONOCYTES NFR BLD: 5 % (ref 4–8)
NEUTROPHILS NFR BLD: 63 % (ref 47–75)
NEUTS SEG NFR BLD: 6.21 K/UL (ref 2.5–7)
PLATELET # BLD AUTO: 293 K/UL (ref 140–450)
PMV BLD AUTO: 10.3 FL (ref 6–12)
RBC # BLD AUTO: 5.05 M/UL (ref 4–5.2)
URATE SERPL-MCNC: 4 MG/DL (ref 2.4–5.7)
WBC OTHER # BLD: 9.9 K/UL (ref 3.5–11)

## 2024-06-18 PROCEDURE — 84550 ASSAY OF BLOOD/URIC ACID: CPT

## 2024-06-18 PROCEDURE — 86140 C-REACTIVE PROTEIN: CPT

## 2024-06-18 PROCEDURE — 36415 COLL VENOUS BLD VENIPUNCTURE: CPT

## 2024-06-18 PROCEDURE — 73610 X-RAY EXAM OF ANKLE: CPT

## 2024-06-18 PROCEDURE — 85025 COMPLETE CBC W/AUTO DIFF WBC: CPT

## 2024-06-18 PROCEDURE — 85652 RBC SED RATE AUTOMATED: CPT

## 2024-06-18 PROCEDURE — 99213 OFFICE O/P EST LOW 20 MIN: CPT | Performed by: STUDENT IN AN ORGANIZED HEALTH CARE EDUCATION/TRAINING PROGRAM

## 2024-06-18 PROCEDURE — 82306 VITAMIN D 25 HYDROXY: CPT

## 2024-06-18 RX ORDER — ERGOCALCIFEROL 1.25 MG/1
50000 CAPSULE ORAL WEEKLY
Qty: 4 CAPSULE | Refills: 0 | Status: SHIPPED | OUTPATIENT
Start: 2024-06-18

## 2024-06-18 ASSESSMENT — ENCOUNTER SYMPTOMS
BACK PAIN: 0
SINUS PAIN: 0
RHINORRHEA: 0
COUGH: 0
ABDOMINAL PAIN: 0
VOMITING: 0
NAUSEA: 0
WHEEZING: 0
DIARRHEA: 0

## 2024-06-18 NOTE — PATIENT INSTRUCTIONS
SURVEY:    You may be receiving a survey from [facility] regarding your visit today.    You may get this in the mail, through your MyChart or in your email.     Please complete the survey to enable us to provide the highest quality of care to you and your family.    If you cannot score us as very good ( 5 Stars) on any question, please feel free to call the office to discuss how we could have made your experience exceptional.     Thank you.    Clinical Care Team:  Dr. Jorge Rodríguez, DO Monica Small LPN    Triage:  Leticia Kessler CMA    Clerical Team:  Leticia Ramos

## 2024-06-18 NOTE — TELEPHONE ENCOUNTER
From: Dr. Jorge Rodríguez  To: Maricel Adams  Sent: 6/18/2024 11:19 AM EDT  Subject: results    Josiah Connelly, your CRP is just a little bit elevated but your ESR is not elevated. Your uric acid is normal, so this tells me that this is not a gout flareup. I believe this is more likely a flareup of wear-and-tear arthritis, potentially posttraumatic arthritis since you have a history of injury to this area. There is no broken bone in the x-ray looks very healthy. What over-the-counter pain medications have you taken so far for this problem?    Dr. ROBERSON

## 2024-06-18 NOTE — PROGRESS NOTES
HPI Notes    Name: Maricel Adams  : 1970         Chief Complaint:     Chief Complaint   Patient presents with    Swelling     Left ankle swelling started 3 days ago. She experiences the feeling of pens and needles as well.    Results     Discuss Vit D results       History of Present Illness:      HPI    This is a 54-year-old woman presenting to discuss acute left ankle swelling, pain, hyperesthesia beginning 3 days ago.  There is no associated overuse, injury, trauma.  She is experiencing a feeling of paresthesia over this joint as well.  The joint is not hot or red, however.  This is an area that she has injured in the remote past.  Pain is worse when weightbearing, active range of motion of the ankle and relieved somewhat by rest.    I had also been treating her for vitamin D deficiency, she has recently completed 4 weeks of high-dose vitamin D replacement and has recheck the level.  The level is still mildly deficient.    Past Medical History:     Past Medical History:   Diagnosis Date    Asthma     Glaucoma     Closed fior Glaucoma      Reviewed all health maintenance requirements and ordered appropriate tests  Health Maintenance Due   Topic Date Due    Hepatitis B vaccine (1 of 3 - 3-dose series) Never done    COVID-19 Vaccine (1) Never done    Breast cancer screen  Never done    Shingles vaccine (1 of 2) Never done       Past Surgical History:     Past Surgical History:   Procedure Laterality Date    CARPAL TUNNEL RELEASE      CHOLECYSTECTOMY      EYE SURGERY      HYSTERECTOMY (CERVIX STATUS UNKNOWN)      SHOULDER SURGERY      x 3, left    TUBAL LIGATION          Medications:       Prior to Admission medications    Medication Sig Start Date End Date Taking? Authorizing Provider   vitamin D (CHOLECALCIFEROL) 25 MCG (1000 UT) TABS tablet Please take this medication 3 times daily after your weekly vit d 75284 units have been completed  And reminding to get lab redone vit d  Week of may 15 the order

## 2024-06-19 DIAGNOSIS — E55.9 VITAMIN D DEFICIENCY: Primary | ICD-10-CM

## 2024-07-17 DIAGNOSIS — E55.9 VITAMIN D DEFICIENCY: ICD-10-CM

## 2024-07-17 NOTE — TELEPHONE ENCOUNTER
Last OV: 6/18/2024  Vit D   Last RX:    Next scheduled apt: 11/18/2024   6 months GERD           Surescript requesting a refill

## 2024-07-19 RX ORDER — ERGOCALCIFEROL 1.25 MG/1
CAPSULE ORAL
Qty: 4 CAPSULE | Refills: 0 | OUTPATIENT
Start: 2024-07-19

## 2024-07-22 ENCOUNTER — HOSPITAL ENCOUNTER (OUTPATIENT)
Age: 54
Discharge: HOME | End: 2024-07-22
Payer: COMMERCIAL

## 2024-07-22 DIAGNOSIS — E55.9 VITAMIN D DEFICIENCY: ICD-10-CM

## 2024-07-22 PROCEDURE — 82306 VITAMIN D 25 HYDROXY: CPT

## 2024-07-22 PROCEDURE — 36415 COLL VENOUS BLD VENIPUNCTURE: CPT

## 2024-07-23 ENCOUNTER — PATIENT MESSAGE (OUTPATIENT)
Dept: FAMILY MEDICINE CLINIC | Age: 54
End: 2024-07-23

## 2024-07-23 LAB — 25(OH)D3 SERPL-MCNC: 29.6 NG/ML (ref 30–100)

## 2024-08-19 ENCOUNTER — HOSPITAL ENCOUNTER (OUTPATIENT)
Dept: GENERAL RADIOLOGY | Age: 54
Discharge: HOME | End: 2024-08-21
Payer: COMMERCIAL

## 2024-08-19 ENCOUNTER — HOSPITAL ENCOUNTER (OUTPATIENT)
Age: 54
Discharge: HOME | End: 2024-08-21
Payer: COMMERCIAL

## 2024-08-19 ENCOUNTER — OFFICE VISIT (OUTPATIENT)
Dept: FAMILY MEDICINE CLINIC | Age: 54
End: 2024-08-19
Payer: COMMERCIAL

## 2024-08-19 VITALS
DIASTOLIC BLOOD PRESSURE: 70 MMHG | SYSTOLIC BLOOD PRESSURE: 136 MMHG | OXYGEN SATURATION: 97 % | BODY MASS INDEX: 27.4 KG/M2 | HEART RATE: 76 BPM | WEIGHT: 145 LBS

## 2024-08-19 DIAGNOSIS — M21.922 SHOULDER JOINT DEFORMITY, LEFT: ICD-10-CM

## 2024-08-19 DIAGNOSIS — M25.512 ACUTE PAIN OF LEFT SHOULDER: ICD-10-CM

## 2024-08-19 DIAGNOSIS — M75.92 LEFT SUPRASPINATUS TENDINITIS: ICD-10-CM

## 2024-08-19 DIAGNOSIS — M25.512 ACUTE PAIN OF LEFT SHOULDER: Primary | ICD-10-CM

## 2024-08-19 PROCEDURE — 99213 OFFICE O/P EST LOW 20 MIN: CPT | Performed by: STUDENT IN AN ORGANIZED HEALTH CARE EDUCATION/TRAINING PROGRAM

## 2024-08-19 PROCEDURE — 73030 X-RAY EXAM OF SHOULDER: CPT

## 2024-08-19 PROCEDURE — 73050 X-RAY EXAM OF SHOULDERS: CPT

## 2024-08-19 ASSESSMENT — ENCOUNTER SYMPTOMS
SINUS PAIN: 0
WHEEZING: 0
ABDOMINAL PAIN: 0
COUGH: 0
NAUSEA: 0
DIARRHEA: 0
BACK PAIN: 0
VOMITING: 0
RHINORRHEA: 0

## 2024-08-19 NOTE — PATIENT INSTRUCTIONS
SURVEY:    You may be receiving a survey from [facility] regarding your visit today.    You may get this in the mail, through your MyChart or in your email.     Please complete the survey to enable us to provide the highest quality of care to you and your family.    If you cannot score us as very good ( 5 Stars) on any question, please feel free to call the office to discuss how we could have made your experience exceptional.     Thank you.    Clinical Care Team:  Dr. Jorge Rodríguez, DO Moinca Small LPN    Triage:  Leticia Kessler CMA    Clerical Team:  Leticia Ramos

## 2024-08-19 NOTE — PROGRESS NOTES
HPI Notes    Name: Maricel Adams  : 1970         Chief Complaint:     Chief Complaint   Patient presents with    Arm Pain     For 2 weeks patient states the left shoulder has been coming out of place. She feels a burning sensation in the shoulder and tingling in the fingers. Hx of shoulder surgery in        History of Present Illness:        HPI    This is a very  nice 54 year old woman presenting for evaluation of left shoulder pain and deformity noted over the past three weeks. This has happened several times in which her left GH joint apparently spontaneously dislocated and reduced, with minimal effort and pain. She now notes a burning sensation in the shoulder and tingling in all left fingers other than the thumb. She has a Hx left shoulder labral surgery in .     Past Medical History:     Past Medical History:   Diagnosis Date    Asthma     Glaucoma     Closed fior Glaucoma      Reviewed all health maintenance requirements and ordered appropriate tests  Health Maintenance Due   Topic Date Due    Hepatitis B vaccine (1 of 3 - 19+ 3-dose series) Never done    Breast cancer screen  Never done    Shingles vaccine (1 of 2) Never done    COVID-19 Vaccine (2023- season) Never done    Flu vaccine (1) 2024    A1C test (Diabetic or Prediabetic)  2024       Past Surgical History:     Past Surgical History:   Procedure Laterality Date    CARPAL TUNNEL RELEASE      CHOLECYSTECTOMY      EYE SURGERY      HYSTERECTOMY (CERVIX STATUS UNKNOWN)      SHOULDER SURGERY      x 3, left    TUBAL LIGATION          Medications:       Prior to Admission medications    Medication Sig Start Date End Date Taking? Authorizing Provider   vitamin D (CHOLECALCIFEROL) 25 MCG (1000 UT) TABS tablet Please take this medication 3 times daily after your weekly vit d 21345 units have been completed  And reminding to get lab redone vit d  Week of may 15 the order will already be there 3/22/23  Yes Provider,

## 2024-10-14 ENCOUNTER — OFFICE VISIT (OUTPATIENT)
Dept: FAMILY MEDICINE CLINIC | Age: 54
End: 2024-10-14
Payer: COMMERCIAL

## 2024-10-14 VITALS
WEIGHT: 146 LBS | OXYGEN SATURATION: 98 % | BODY MASS INDEX: 27.59 KG/M2 | SYSTOLIC BLOOD PRESSURE: 132 MMHG | HEART RATE: 77 BPM | DIASTOLIC BLOOD PRESSURE: 70 MMHG

## 2024-10-14 DIAGNOSIS — M25.521 BILATERAL ELBOW JOINT PAIN: ICD-10-CM

## 2024-10-14 DIAGNOSIS — M25.561 ACUTE PAIN OF BOTH KNEES: ICD-10-CM

## 2024-10-14 DIAGNOSIS — M25.512 ACUTE PAIN OF BOTH SHOULDERS: Primary | ICD-10-CM

## 2024-10-14 DIAGNOSIS — M15.0 PRIMARY OSTEOARTHRITIS INVOLVING MULTIPLE JOINTS: ICD-10-CM

## 2024-10-14 DIAGNOSIS — M25.522 BILATERAL ELBOW JOINT PAIN: ICD-10-CM

## 2024-10-14 DIAGNOSIS — M25.562 ACUTE PAIN OF BOTH KNEES: ICD-10-CM

## 2024-10-14 DIAGNOSIS — M25.511 ACUTE PAIN OF BOTH SHOULDERS: Primary | ICD-10-CM

## 2024-10-14 PROCEDURE — 99214 OFFICE O/P EST MOD 30 MIN: CPT | Performed by: STUDENT IN AN ORGANIZED HEALTH CARE EDUCATION/TRAINING PROGRAM

## 2024-10-14 RX ORDER — MELOXICAM 15 MG/1
15 TABLET ORAL DAILY
Qty: 30 TABLET | Refills: 5 | Status: SHIPPED | OUTPATIENT
Start: 2024-10-14

## 2024-10-14 SDOH — ECONOMIC STABILITY: FOOD INSECURITY: WITHIN THE PAST 12 MONTHS, YOU WORRIED THAT YOUR FOOD WOULD RUN OUT BEFORE YOU GOT MONEY TO BUY MORE.: NEVER TRUE

## 2024-10-14 SDOH — ECONOMIC STABILITY: FOOD INSECURITY: WITHIN THE PAST 12 MONTHS, THE FOOD YOU BOUGHT JUST DIDN'T LAST AND YOU DIDN'T HAVE MONEY TO GET MORE.: NEVER TRUE

## 2024-10-14 SDOH — ECONOMIC STABILITY: INCOME INSECURITY: HOW HARD IS IT FOR YOU TO PAY FOR THE VERY BASICS LIKE FOOD, HOUSING, MEDICAL CARE, AND HEATING?: NOT HARD AT ALL

## 2024-10-14 ASSESSMENT — ENCOUNTER SYMPTOMS
ABDOMINAL PAIN: 0
BACK PAIN: 0
SINUS PAIN: 0
DIARRHEA: 0
COUGH: 0
WHEEZING: 0
VOMITING: 0
RHINORRHEA: 0
NAUSEA: 0

## 2024-10-14 NOTE — PROGRESS NOTES
HPI Notes    Name: Maricel Adams  : 1970         Chief Complaint:     Chief Complaint   Patient presents with    Joint Pain     Patient is experiencing all over joint discomfort. Pain is so bad at night its starting to effect her sleep.        History of Present Illness:      HPI    This is a 54-year-old woman presenting for generalized arthralgias.  This is particularly bad in the evening and has begun to negatively affect her ability to sleep. This is a burning, aching discomfort which primarily is effecting the large and intermediate joints (hips, shoulders, elbows, wrists), worsening over the past three weeks. The left elbow was first affected, with other joints following suit. Nothing seems to improve it but nothing seems to worsen it either. She has been taking some ibuprofen without much appreciable improvement. She has even missed work for this problem.     Past Medical History:     Past Medical History:   Diagnosis Date    Asthma     Glaucoma     Closed fior Glaucoma      Reviewed all health maintenance requirements and ordered appropriate tests  Health Maintenance Due   Topic Date Due    Hepatitis B vaccine (1 of 3 - 19+ 3-dose series) Never done    Breast cancer screen  Never done    Shingles vaccine (1 of 2) Never done    Flu vaccine (1) 2024    A1C test (Diabetic or Prediabetic)  2024    COVID-19 Vaccine ( season) Never done       Past Surgical History:     Past Surgical History:   Procedure Laterality Date    CARPAL TUNNEL RELEASE      CHOLECYSTECTOMY      EYE SURGERY      HYSTERECTOMY (CERVIX STATUS UNKNOWN)      SHOULDER SURGERY      x 3, left    TUBAL LIGATION          Medications:       Prior to Admission medications    Medication Sig Start Date End Date Taking? Authorizing Provider   meloxicam (MOBIC) 15 MG tablet Take 1 tablet by mouth daily 10/14/24  Yes Jorge Rodríguez, DO   vitamin D (CHOLECALCIFEROL) 25 MCG (1000 UT) TABS tablet Please take this

## 2024-10-14 NOTE — PATIENT INSTRUCTIONS
SURVEY:    You may be receiving a survey from [facility] regarding your visit today.    You may get this in the mail, through your MyChart or in your email.     Please complete the survey to enable us to provide the highest quality of care to you and your family.    If you cannot score us as very good ( 5 Stars) on any question, please feel free to call the office to discuss how we could have made your experience exceptional.     Thank you.    Clinical Care Team:  Dr. Jorge Rodríguez, DO Monica Small LPN    Triage:  Leticia Kessler CMA    Clerical Team:  Leticia Ramos

## 2024-11-18 ENCOUNTER — HOSPITAL ENCOUNTER (OUTPATIENT)
Age: 54
Discharge: HOME | End: 2024-11-18
Payer: COMMERCIAL

## 2024-11-18 ENCOUNTER — OFFICE VISIT (OUTPATIENT)
Dept: FAMILY MEDICINE CLINIC | Age: 54
End: 2024-11-18

## 2024-11-18 VITALS
DIASTOLIC BLOOD PRESSURE: 80 MMHG | OXYGEN SATURATION: 97 % | HEART RATE: 74 BPM | BODY MASS INDEX: 27.78 KG/M2 | SYSTOLIC BLOOD PRESSURE: 136 MMHG | WEIGHT: 147 LBS

## 2024-11-18 DIAGNOSIS — E55.9 VITAMIN D DEFICIENCY: ICD-10-CM

## 2024-11-18 DIAGNOSIS — Z23 NEEDS FLU SHOT: ICD-10-CM

## 2024-11-18 DIAGNOSIS — Z12.31 BREAST CANCER SCREENING BY MAMMOGRAM: ICD-10-CM

## 2024-11-18 DIAGNOSIS — Z13.1 SCREENING FOR DIABETES MELLITUS (DM): ICD-10-CM

## 2024-11-18 DIAGNOSIS — Z13.29 SCREENING FOR HYPOTHYROIDISM: ICD-10-CM

## 2024-11-18 DIAGNOSIS — E55.9 VITAMIN D DEFICIENCY: Primary | ICD-10-CM

## 2024-11-18 LAB
25(OH)D3 SERPL-MCNC: 14.3 NG/ML (ref 30–100)
EST. AVERAGE GLUCOSE BLD GHB EST-MCNC: 111 MG/DL
HBA1C MFR BLD: 5.5 % (ref 4–6)
TSH SERPL DL<=0.05 MIU/L-ACNC: 1.36 UIU/ML (ref 0.3–5)

## 2024-11-18 PROCEDURE — 83036 HEMOGLOBIN GLYCOSYLATED A1C: CPT

## 2024-11-18 PROCEDURE — 82306 VITAMIN D 25 HYDROXY: CPT

## 2024-11-18 PROCEDURE — 36415 COLL VENOUS BLD VENIPUNCTURE: CPT

## 2024-11-18 PROCEDURE — 84443 ASSAY THYROID STIM HORMONE: CPT

## 2024-11-18 ASSESSMENT — ENCOUNTER SYMPTOMS
RHINORRHEA: 0
BACK PAIN: 0
DIARRHEA: 0
VOMITING: 0
WHEEZING: 0
ABDOMINAL PAIN: 0
NAUSEA: 0
SINUS PAIN: 0
COUGH: 0

## 2024-11-18 NOTE — PATIENT INSTRUCTIONS
SURVEY:    You may be receiving a survey from [facility] regarding your visit today.    You may get this in the mail, through your MyChart or in your email.     Please complete the survey to enable us to provide the highest quality of care to you and your family.    If you cannot score us as very good ( 5 Stars) on any question, please feel free to call the office to discuss how we could have made your experience exceptional.     Thank you.    Clinical Care Team:  Dr. Jorge Rodríguez, DO Monica Small LPN    Triage:  Leticia Kessler CMA    Clerical Team:  Leticia Ramos

## 2024-11-18 NOTE — PROGRESS NOTES
HPI Notes    Name: Maricel Adams  : 1970         Chief Complaint:     Chief Complaint   Patient presents with   • Gastroesophageal Reflux     6 month follow up.       History of Present Illness:        HPI    This is a 54-year-old woman s/p Mohs surgery presenting for an interval medical examination.  She reports that since last outpatient visit, her gastroesophageal reflux has completely resolved and she is no longer needing to take any antacids.  She is in need of screening for type 2 diabetes, hyperlipidemia and reevaluating her vitamin D since it has been historically low.  She also is in need of a screening mammogram and this year's annual influenza vaccine.     Past Medical History:     Past Medical History:   Diagnosis Date   • Asthma    • Glaucoma     Closed fior Glaucoma      Reviewed all health maintenance requirements and ordered appropriate tests  Health Maintenance Due   Topic Date Due   • Hepatitis B vaccine (1 of 3 - 19+ 3-dose series) Never done   • Breast cancer screen  Never done   • Shingles vaccine (1 of 2) Never done   • A1C test (Diabetic or Prediabetic)  2024   • COVID-19 Vaccine (2023- season) Never done       Past Surgical History:     Past Surgical History:   Procedure Laterality Date   • CARPAL TUNNEL RELEASE     • CHOLECYSTECTOMY     • EYE SURGERY     • HYSTERECTOMY (CERVIX STATUS UNKNOWN)     • SHOULDER SURGERY      x 3, left   • TUBAL LIGATION          Medications:       Prior to Admission medications    Medication Sig Start Date End Date Taking? Authorizing Provider   Vitamin D3 125 MCG (5000 UT) TABS tablet Take 1 tablet by mouth daily   Yes Rodriguez Leiva MD   meloxicam (MOBIC) 15 MG tablet Take 1 tablet by mouth daily 10/14/24  Yes Jorge Rodríguez DO   Magnesium Cl-Calcium Carbonate (SLOW-MAG PO) Take by mouth Takes 1 tab in the am and 1 tab in the PM   Yes Rodriguez Leiva MD   latanoprost (XALATAN) 0.005 % ophthalmic solution  22  Yes

## 2024-11-19 ENCOUNTER — TELEPHONE (OUTPATIENT)
Dept: FAMILY MEDICINE CLINIC | Age: 54
End: 2024-11-19

## 2024-11-19 DIAGNOSIS — Z12.31 BREAST CANCER SCREENING BY MAMMOGRAM: ICD-10-CM

## 2024-11-19 DIAGNOSIS — E55.9 VITAMIN D DEFICIENCY: Primary | ICD-10-CM

## 2024-11-19 DIAGNOSIS — N63.0 BREAST MASS IN FEMALE: ICD-10-CM

## 2024-11-19 RX ORDER — ERGOCALCIFEROL 1.25 MG/1
50000 CAPSULE, LIQUID FILLED ORAL WEEKLY
Qty: 4 CAPSULE | Refills: 0 | Status: SHIPPED | OUTPATIENT
Start: 2024-11-19

## 2024-11-19 NOTE — TELEPHONE ENCOUNTER
Scheduling called stating pt advised that she has a lump in her left breast. Should the mammogram be changed to diagnostic? Please advise

## 2024-11-20 DIAGNOSIS — N63.0 BREAST MASS IN FEMALE: Primary | ICD-10-CM

## 2024-12-17 ENCOUNTER — HOSPITAL ENCOUNTER (OUTPATIENT)
Dept: ULTRASOUND IMAGING | Age: 54
Discharge: HOME | End: 2024-12-19
Attending: STUDENT IN AN ORGANIZED HEALTH CARE EDUCATION/TRAINING PROGRAM
Payer: COMMERCIAL

## 2024-12-17 ENCOUNTER — HOSPITAL ENCOUNTER (OUTPATIENT)
Dept: MAMMOGRAPHY | Age: 54
Discharge: HOME | End: 2024-12-19
Attending: STUDENT IN AN ORGANIZED HEALTH CARE EDUCATION/TRAINING PROGRAM
Payer: COMMERCIAL

## 2024-12-17 DIAGNOSIS — N63.0 BREAST MASS IN FEMALE: ICD-10-CM

## 2024-12-17 DIAGNOSIS — Z12.31 BREAST CANCER SCREENING BY MAMMOGRAM: ICD-10-CM

## 2024-12-17 PROCEDURE — G0279 TOMOSYNTHESIS, MAMMO: HCPCS

## 2024-12-17 PROCEDURE — 76642 ULTRASOUND BREAST LIMITED: CPT

## 2024-12-19 ENCOUNTER — HOSPITAL ENCOUNTER (OUTPATIENT)
Age: 54
Discharge: HOME | End: 2024-12-19
Payer: COMMERCIAL

## 2024-12-19 DIAGNOSIS — E55.9 VITAMIN D DEFICIENCY: ICD-10-CM

## 2024-12-19 LAB — 25(OH)D3 SERPL-MCNC: 19.6 NG/ML (ref 30–100)

## 2024-12-19 PROCEDURE — 82306 VITAMIN D 25 HYDROXY: CPT

## 2024-12-19 PROCEDURE — 36415 COLL VENOUS BLD VENIPUNCTURE: CPT

## 2024-12-20 ENCOUNTER — TELEPHONE (OUTPATIENT)
Dept: FAMILY MEDICINE CLINIC | Age: 54
End: 2024-12-20

## 2024-12-20 DIAGNOSIS — E55.9 VITAMIN D DEFICIENCY: Primary | ICD-10-CM

## 2024-12-20 RX ORDER — ERGOCALCIFEROL 1.25 MG/1
50000 CAPSULE, LIQUID FILLED ORAL WEEKLY
Qty: 8 CAPSULE | Refills: 0 | Status: SHIPPED | OUTPATIENT
Start: 2024-12-20 | End: 2025-02-08

## 2024-12-20 NOTE — TELEPHONE ENCOUNTER
Vitamin D still low. I'm sending in eight weeks of treatment with the 50,000 IU per week dose; recheck in 8 weeks. Orders placed.

## 2025-01-31 ENCOUNTER — PATIENT MESSAGE (OUTPATIENT)
Dept: FAMILY MEDICINE CLINIC | Age: 55
End: 2025-01-31

## 2025-01-31 DIAGNOSIS — H35.30 MACULAR DEGENERATION OF BOTH EYES, UNSPECIFIED TYPE: Primary | ICD-10-CM

## 2025-02-09 DIAGNOSIS — E55.9 VITAMIN D DEFICIENCY: ICD-10-CM

## 2025-02-10 RX ORDER — ERGOCALCIFEROL 1.25 MG/1
50000 CAPSULE, LIQUID FILLED ORAL WEEKLY
Qty: 8 CAPSULE | Refills: 0 | OUTPATIENT
Start: 2025-02-10 | End: 2025-04-01

## 2025-02-15 ENCOUNTER — HOSPITAL ENCOUNTER (OUTPATIENT)
Age: 55
Discharge: HOME | End: 2025-02-15
Payer: COMMERCIAL

## 2025-02-15 DIAGNOSIS — E55.9 VITAMIN D DEFICIENCY: ICD-10-CM

## 2025-02-15 LAB — 25(OH)D3 SERPL-MCNC: 30.8 NG/ML (ref 30–100)

## 2025-02-15 PROCEDURE — 36415 COLL VENOUS BLD VENIPUNCTURE: CPT

## 2025-02-15 PROCEDURE — 82306 VITAMIN D 25 HYDROXY: CPT

## 2025-02-17 ENCOUNTER — TELEPHONE (OUTPATIENT)
Dept: FAMILY MEDICINE CLINIC | Age: 55
End: 2025-02-17

## 2025-02-17 NOTE — TELEPHONE ENCOUNTER
Patient was notified of results, patient is finished with the High dose vitamin D. Patient would like to know if she can go on a maintenance dose to maintain her vitamin D level? - Per pt ok to respond to her via Mychart.

## 2025-02-17 NOTE — TELEPHONE ENCOUNTER
Message  Received: Today  Jorge Rodríguez, DO  Leticia Kessler, MA  Vitamin D level finally sufficient; how many doses does she have left of her high dose capsules?

## 2025-04-07 DIAGNOSIS — M25.511 ACUTE PAIN OF BOTH SHOULDERS: ICD-10-CM

## 2025-04-07 DIAGNOSIS — M25.562 ACUTE PAIN OF BOTH KNEES: ICD-10-CM

## 2025-04-07 DIAGNOSIS — M25.512 ACUTE PAIN OF BOTH SHOULDERS: ICD-10-CM

## 2025-04-07 DIAGNOSIS — M25.561 ACUTE PAIN OF BOTH KNEES: ICD-10-CM

## 2025-04-07 DIAGNOSIS — M15.0 PRIMARY OSTEOARTHRITIS INVOLVING MULTIPLE JOINTS: ICD-10-CM

## 2025-04-07 DIAGNOSIS — M25.522 BILATERAL ELBOW JOINT PAIN: ICD-10-CM

## 2025-04-07 DIAGNOSIS — M25.521 BILATERAL ELBOW JOINT PAIN: ICD-10-CM

## 2025-04-07 RX ORDER — MELOXICAM 15 MG/1
15 TABLET ORAL DAILY
Qty: 90 TABLET | Refills: 3 | Status: SHIPPED | OUTPATIENT
Start: 2025-04-07

## 2025-04-14 DIAGNOSIS — M25.562 ACUTE PAIN OF BOTH KNEES: ICD-10-CM

## 2025-04-14 DIAGNOSIS — M25.522 BILATERAL ELBOW JOINT PAIN: ICD-10-CM

## 2025-04-14 DIAGNOSIS — M25.521 BILATERAL ELBOW JOINT PAIN: ICD-10-CM

## 2025-04-14 DIAGNOSIS — M25.511 ACUTE PAIN OF BOTH SHOULDERS: ICD-10-CM

## 2025-04-14 DIAGNOSIS — M15.0 PRIMARY OSTEOARTHRITIS INVOLVING MULTIPLE JOINTS: ICD-10-CM

## 2025-04-14 DIAGNOSIS — M25.512 ACUTE PAIN OF BOTH SHOULDERS: ICD-10-CM

## 2025-04-14 DIAGNOSIS — M25.561 ACUTE PAIN OF BOTH KNEES: ICD-10-CM

## 2025-04-15 RX ORDER — MELOXICAM 15 MG/1
15 TABLET ORAL DAILY
Qty: 90 TABLET | Refills: 3 | OUTPATIENT
Start: 2025-04-15

## 2025-04-17 NOTE — TELEPHONE ENCOUNTER
Last OV: 11/18/2024 Vit-D deficiency   Last RX:    Next scheduled apt: 5/19/2025  check up               Surescript requesting a refill   Medication pending for approval    cigarettes

## 2025-05-20 ENCOUNTER — OFFICE VISIT (OUTPATIENT)
Dept: FAMILY MEDICINE CLINIC | Age: 55
End: 2025-05-20
Payer: COMMERCIAL

## 2025-05-20 ENCOUNTER — HOSPITAL ENCOUNTER (OUTPATIENT)
Age: 55
Discharge: HOME | End: 2025-05-20
Payer: COMMERCIAL

## 2025-05-20 VITALS
WEIGHT: 142 LBS | DIASTOLIC BLOOD PRESSURE: 70 MMHG | OXYGEN SATURATION: 96 % | HEART RATE: 80 BPM | SYSTOLIC BLOOD PRESSURE: 110 MMHG | BODY MASS INDEX: 26.83 KG/M2

## 2025-05-20 DIAGNOSIS — Z00.00 ANNUAL PHYSICAL EXAM: ICD-10-CM

## 2025-05-20 DIAGNOSIS — E55.9 VITAMIN D DEFICIENCY: ICD-10-CM

## 2025-05-20 DIAGNOSIS — Z12.39 SCREENING FOR BREAST CANCER USING NON-MAMMOGRAM MODALITY: ICD-10-CM

## 2025-05-20 DIAGNOSIS — T50.905D MEDICATION SIDE EFFECT, SUBSEQUENT ENCOUNTER: ICD-10-CM

## 2025-05-20 DIAGNOSIS — Z13.220 SCREENING FOR HYPERLIPIDEMIA: ICD-10-CM

## 2025-05-20 DIAGNOSIS — Z00.00 ANNUAL PHYSICAL EXAM: Primary | ICD-10-CM

## 2025-05-20 LAB
ALBUMIN SERPL-MCNC: 4.2 G/DL (ref 3.5–5.2)
ALBUMIN/GLOB SERPL: 1.5 {RATIO} (ref 1–2.5)
ALP SERPL-CCNC: 112 U/L (ref 35–104)
ALT SERPL-CCNC: 15 U/L (ref 5–33)
ANION GAP SERPL CALCULATED.3IONS-SCNC: 10 MMOL/L (ref 9–17)
AST SERPL-CCNC: 17 U/L
BILIRUB SERPL-MCNC: 0.4 MG/DL (ref 0.3–1.2)
BUN SERPL-MCNC: 17 MG/DL (ref 6–20)
CALCIUM SERPL-MCNC: 9.4 MG/DL (ref 8.6–10.4)
CHLORIDE SERPL-SCNC: 103 MMOL/L (ref 98–107)
CO2 SERPL-SCNC: 24 MMOL/L (ref 20–31)
CREAT SERPL-MCNC: 0.8 MG/DL (ref 0.5–0.9)
GFR, ESTIMATED: 88 ML/MIN/1.73M2
GLUCOSE SERPL-MCNC: 99 MG/DL (ref 70–99)
POTASSIUM SERPL-SCNC: 4.5 MMOL/L (ref 3.7–5.3)
PROT SERPL-MCNC: 7 G/DL (ref 6.4–8.3)
SODIUM SERPL-SCNC: 137 MMOL/L (ref 135–144)

## 2025-05-20 PROCEDURE — 80053 COMPREHEN METABOLIC PANEL: CPT

## 2025-05-20 PROCEDURE — 36415 COLL VENOUS BLD VENIPUNCTURE: CPT

## 2025-05-20 PROCEDURE — 99396 PREV VISIT EST AGE 40-64: CPT | Performed by: STUDENT IN AN ORGANIZED HEALTH CARE EDUCATION/TRAINING PROGRAM

## 2025-05-20 PROCEDURE — 82306 VITAMIN D 25 HYDROXY: CPT

## 2025-05-20 PROCEDURE — 80061 LIPID PANEL: CPT

## 2025-05-20 SDOH — ECONOMIC STABILITY: FOOD INSECURITY: WITHIN THE PAST 12 MONTHS, YOU WORRIED THAT YOUR FOOD WOULD RUN OUT BEFORE YOU GOT MONEY TO BUY MORE.: NEVER TRUE

## 2025-05-20 SDOH — ECONOMIC STABILITY: FOOD INSECURITY: WITHIN THE PAST 12 MONTHS, THE FOOD YOU BOUGHT JUST DIDN'T LAST AND YOU DIDN'T HAVE MONEY TO GET MORE.: NEVER TRUE

## 2025-05-20 ASSESSMENT — ENCOUNTER SYMPTOMS
VOMITING: 0
DIARRHEA: 0
SINUS PAIN: 0
BACK PAIN: 0
NAUSEA: 0
RHINORRHEA: 0
WHEEZING: 0
ABDOMINAL PAIN: 0
COUGH: 0

## 2025-05-20 ASSESSMENT — PATIENT HEALTH QUESTIONNAIRE - PHQ9
SUM OF ALL RESPONSES TO PHQ QUESTIONS 1-9: 0
2. FEELING DOWN, DEPRESSED OR HOPELESS: NOT AT ALL
SUM OF ALL RESPONSES TO PHQ QUESTIONS 1-9: 0
1. LITTLE INTEREST OR PLEASURE IN DOING THINGS: NOT AT ALL
SUM OF ALL RESPONSES TO PHQ QUESTIONS 1-9: 0
SUM OF ALL RESPONSES TO PHQ QUESTIONS 1-9: 0

## 2025-05-20 NOTE — PATIENT INSTRUCTIONS
SURVEY:    You may be receiving a survey from [facility] regarding your visit today.    You may get this in the mail, through your MyChart or in your email.     Please complete the survey to enable us to provide the highest quality of care to you and your family.    If you cannot score us as very good ( 5 Stars) on any question, please feel free to call the office to discuss how we could have made your experience exceptional.     Thank you.    Clinical Care Team:  Dr. Jorge Rodríguez, DO Monica Small LPN    Triage:  Leticia Kessler CMA    Clerical Team:  Leticia Ramos

## 2025-05-20 NOTE — PROGRESS NOTES
HPI Notes    Name: Maricel Adams  : 1970         Chief Complaint:     Chief Complaint   Patient presents with    Annual Exam       History of Present Illness:      HPI    This is a 54-year-old woman presenting for an annual physical. She has a history of vitamin D deficiency and continues to take OTC vitamin D3 at a 5000 IU/day dose. She has noted no changes to her breast masses.     She also reports that she may have had an allergic reaction to a flu shot.  The day after her flu shot she developed shortness of breath, laryngitis and a blistering rash of her lower abdomen and right anterior thigh.  She was seen at the emergency department in [place] for this problem.    Past Medical History:     Past Medical History:   Diagnosis Date    Asthma     Glaucoma     Closed fior Glaucoma      Reviewed all health maintenance requirements and ordered appropriate tests  Health Maintenance Due   Topic Date Due    Hepatitis B vaccine (1 of 3 - 19+ 3-dose series) Never done    Shingles vaccine (1 of 2) Never done    COVID-19 Vaccine ( season) Never done       Past Surgical History:     Past Surgical History:   Procedure Laterality Date    CARPAL TUNNEL RELEASE      CHOLECYSTECTOMY      EYE SURGERY      HYSTERECTOMY (CERVIX STATUS UNKNOWN)      age 30    SHOULDER SURGERY      x 3, left    TUBAL LIGATION          Medications:       Prior to Admission medications    Medication Sig Start Date End Date Taking? Authorizing Provider   meloxicam (MOBIC) 15 MG tablet Take 1 tablet by mouth once daily 25  Yes Jorge Rodríguez DO   Magnesium Cl-Calcium Carbonate (SLOW-MAG PO) Take by mouth Takes 1 tab in the am and 1 tab in the PM   Yes Provider, MD Rodriguez   latanoprost (XALATAN) 0.005 % ophthalmic solution  22  Yes Provider, MD Rodriguez        Allergies:       Adhesive tape, Celebrex [celecoxib], Codeine, Demerol hcl [meperidine], Dexamethasone, Hydrocodone-acetaminophen,

## 2025-05-21 ENCOUNTER — RESULTS FOLLOW-UP (OUTPATIENT)
Dept: FAMILY MEDICINE CLINIC | Age: 55
End: 2025-05-21

## 2025-05-21 LAB
25(OH)D3 SERPL-MCNC: 41 NG/ML (ref 30–100)
CHOLEST SERPL-MCNC: 217 MG/DL (ref 0–199)
CHOLESTEROL/HDL RATIO: 5.6
HDLC SERPL-MCNC: 39 MG/DL
LDLC SERPL CALC-MCNC: 157 MG/DL (ref 0–100)
TRIGL SERPL-MCNC: 103 MG/DL
VLDLC SERPL CALC-MCNC: 21 MG/DL (ref 1–30)

## 2025-06-13 ENCOUNTER — OFFICE VISIT (OUTPATIENT)
Dept: FAMILY MEDICINE CLINIC | Age: 55
End: 2025-06-13

## 2025-06-13 VITALS
SYSTOLIC BLOOD PRESSURE: 118 MMHG | WEIGHT: 145 LBS | HEART RATE: 73 BPM | OXYGEN SATURATION: 94 % | DIASTOLIC BLOOD PRESSURE: 70 MMHG | BODY MASS INDEX: 27.38 KG/M2 | HEIGHT: 61 IN

## 2025-06-13 DIAGNOSIS — M79.672 FOOT PAIN, BILATERAL: ICD-10-CM

## 2025-06-13 DIAGNOSIS — L30.1 POMPHOLYX DERMATITIS: ICD-10-CM

## 2025-06-13 DIAGNOSIS — M79.671 FOOT PAIN, BILATERAL: ICD-10-CM

## 2025-06-13 DIAGNOSIS — M25.561 ACUTE BILATERAL KNEE PAIN: ICD-10-CM

## 2025-06-13 DIAGNOSIS — M15.0 PRIMARY OSTEOARTHRITIS INVOLVING MULTIPLE JOINTS: ICD-10-CM
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
## 2025-06-13 DIAGNOSIS — M25.562 ACUTE BILATERAL KNEE PAIN: ICD-10-CM

## 2025-06-13 RX ORDER — CLOBETASOL PROPIONATE 0.5 MG/G
CREAM TOPICAL DAILY
Qty: 60 G | Refills: 0 | Status: SHIPPED | OUTPATIENT
Start: 2025-06-13 | End: 2025-07-04

## 2025-06-13 ASSESSMENT — ENCOUNTER SYMPTOMS
SINUS PAIN: 0
VOMITING: 0
COUGH: 0
RHINORRHEA: 0
BACK PAIN: 0
DIARRHEA: 0
ABDOMINAL PAIN: 0
WHEEZING: 0
NAUSEA: 0

## 2025-06-13 NOTE — PROGRESS NOTES
made your experience exceptional.     Thank you.    Clinical Care Team:  DO Monica Fu LPN    Triage:  Leticia Kessler CMA    Clerical Team:  Leticia Ramos    Electronically signed by Jorge Rodríguez DO on 6/13/2025 at 7:28 AM     Completed Refills   Requested Prescriptions     Signed Prescriptions Disp Refills    clobetasol prop emollient base 0.05 % CREA 60 g 0     Sig: Apply topically daily for 21 days